# Patient Record
Sex: FEMALE | Race: WHITE | NOT HISPANIC OR LATINO | Employment: UNEMPLOYED | URBAN - METROPOLITAN AREA
[De-identification: names, ages, dates, MRNs, and addresses within clinical notes are randomized per-mention and may not be internally consistent; named-entity substitution may affect disease eponyms.]

---

## 2017-01-11 ENCOUNTER — OFFICE VISIT (OUTPATIENT)
Dept: ORTHOPEDIC SURGERY | Facility: CLINIC | Age: 37
End: 2017-01-11

## 2017-01-11 DIAGNOSIS — M75.50 SUBACROMIAL BURSITIS: Primary | ICD-10-CM

## 2017-01-11 PROCEDURE — 99212 OFFICE O/P EST SF 10 MIN: CPT | Performed by: NURSE PRACTITIONER

## 2017-01-11 PROCEDURE — 20610 DRAIN/INJ JOINT/BURSA W/O US: CPT | Performed by: NURSE PRACTITIONER

## 2017-01-11 RX ORDER — MELOXICAM 7.5 MG/1
TABLET ORAL
Refills: 2 | COMMUNITY
Start: 2016-11-10 | End: 2017-06-12

## 2017-01-11 RX ORDER — LIDOCAINE HYDROCHLORIDE 10 MG/ML
2 INJECTION, SOLUTION INFILTRATION; PERINEURAL
Status: COMPLETED | OUTPATIENT
Start: 2017-01-11 | End: 2017-01-11

## 2017-01-11 RX ORDER — BUPIVACAINE HYDROCHLORIDE 5 MG/ML
2 INJECTION, SOLUTION EPIDURAL; INTRACAUDAL
Status: COMPLETED | OUTPATIENT
Start: 2017-01-11 | End: 2017-01-11

## 2017-01-11 RX ORDER — TRIAMCINOLONE ACETONIDE 40 MG/ML
80 INJECTION, SUSPENSION INTRA-ARTICULAR; INTRAMUSCULAR
Status: COMPLETED | OUTPATIENT
Start: 2017-01-11 | End: 2017-01-11

## 2017-01-11 RX ADMIN — BUPIVACAINE HYDROCHLORIDE 2 ML: 5 INJECTION, SOLUTION EPIDURAL; INTRACAUDAL at 11:23

## 2017-01-11 RX ADMIN — TRIAMCINOLONE ACETONIDE 80 MG: 40 INJECTION, SUSPENSION INTRA-ARTICULAR; INTRAMUSCULAR at 11:23

## 2017-01-11 RX ADMIN — LIDOCAINE HYDROCHLORIDE 2 ML: 10 INJECTION, SOLUTION INFILTRATION; PERINEURAL at 11:23
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
## 2017-01-11 NOTE — MR AVS SNAPSHOT
Kristin Taylor   2017 11:15 AM   Office Visit    Dept Phone:  [phone]   Encounter #:  [id number]    Provider:  CHRISTOS Randle   Department:  [facility]                Your Full Care Plan              Your Updated Medication List          This list is accurate as of: 17 11:32 AM.  Always use your most recent med list.                citalopram 20 MG tablet   Commonly known as:  CeleXA       clonazePAM 1 MG tablet   Commonly known as:  KlonoPIN       cyclobenzaprine 10 MG tablet   Commonly known as:  FLEXERIL       diclofenac 50 MG EC tablet   Commonly known as:  VOLTAREN       meloxicam 7.5 MG tablet   Commonly known as:  MOBIC       omeprazole 40 MG capsule   Commonly known as:  priLOSEC   Take 1 capsule by mouth daily.       traMADol 50 MG tablet   Commonly known as:  ULTRAM               We Performed the Following     Large Joint Arthrocentesis       Instructions           Patient Instructions History      Upcoming Appointments     Visit Type Date Time Department    FOLLOW UP 2017 11:15 AM JERERLL ALMAGUER      [employer] Signup     [facility] [employer] allows you to send messages to your doctor, view your test results, renew your prescriptions, schedule appointments, and more. To sign up, go to [employer] and click on the Sign Up Now link in the New User? box. Enter your [employer] Activation Code exactly as it appears below along with the last four digits of your Social Security Number and your Date of Birth () to complete the sign-up process. If you do not sign up before the expiration date, you must request a new code.    [employer] Activation Code: [email or web address]  Expires: 2017 11:32 AM    If you have questions, you can email [email or web address] or call [phone] to talk to our [employer] staff. Remember, [employer] is NOT to be used for urgent needs. For medical emergencies, dial 911.                  Other Info from Your Visit           Allergies     No Known Allergies      Reason for Visit     Right Shoulder - Pain, Follow-up           Vital Signs     Smoking Status                   Current Every Day Smoker

## 2017-02-09 ENCOUNTER — TELEPHONE (OUTPATIENT)
Dept: ORTHOPEDIC SURGERY | Facility: CLINIC | Age: 37
End: 2017-02-09

## 2017-02-09 DIAGNOSIS — S46.911D RIGHT SHOULDER STRAIN, SUBSEQUENT ENCOUNTER: ICD-10-CM

## 2017-02-09 DIAGNOSIS — M75.50 SUBACROMIAL BURSITIS: Primary | ICD-10-CM

## 2017-02-09 NOTE — TELEPHONE ENCOUNTER
Would like to have MRI R shoulder done; she is still in pain. Would like it on a Wednesday, or any other day after 1pm.

## 2017-02-15 ENCOUNTER — HOSPITAL ENCOUNTER (OUTPATIENT)
Dept: MRI IMAGING | Facility: HOSPITAL | Age: 37
Discharge: HOME | End: 2017-02-15
Admitting: NURSE PRACTITIONER

## 2017-02-15 DIAGNOSIS — S46.911D RIGHT SHOULDER STRAIN, SUBSEQUENT ENCOUNTER: ICD-10-CM

## 2017-02-15 DIAGNOSIS — M75.50 SUBACROMIAL BURSITIS: ICD-10-CM

## 2017-02-15 PROCEDURE — 73221 MRI JOINT UPR EXTREM W/O DYE: CPT

## 2017-02-17 ENCOUNTER — OFFICE VISIT (OUTPATIENT)
Dept: ORTHOPEDIC SURGERY | Facility: CLINIC | Age: 37
End: 2017-02-17

## 2017-02-17 DIAGNOSIS — M67.911 TENDINOPATHY OF ROTATOR CUFF, RIGHT: Primary | ICD-10-CM

## 2017-02-17 PROBLEM — M67.919 TENDINOPATHY OF ROTATOR CUFF: Status: ACTIVE | Noted: 2017-02-17

## 2017-02-17 PROCEDURE — 99212 OFFICE O/P EST SF 10 MIN: CPT | Performed by: NURSE PRACTITIONER

## 2017-02-17 RX ORDER — SULFAMETHOXAZOLE AND TRIMETHOPRIM 800; 160 MG/1; MG/1
TABLET ORAL
Refills: 0 | COMMUNITY
Start: 2017-01-30 | End: 2017-06-12

## 2017-02-17 RX ORDER — MELOXICAM 15 MG/1
TABLET ORAL
Refills: 0 | COMMUNITY
Start: 2017-01-26 | End: 2018-11-09
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
## 2017-05-01 ENCOUNTER — OFFICE VISIT (OUTPATIENT)
Dept: ORTHOPEDIC SURGERY | Facility: CLINIC | Age: 37
End: 2017-05-01

## 2017-05-01 DIAGNOSIS — M67.911 TENDINOPATHY OF ROTATOR CUFF, RIGHT: Primary | ICD-10-CM

## 2017-05-01 DIAGNOSIS — M75.50 SUBACROMIAL BURSITIS: ICD-10-CM

## 2017-05-01 PROCEDURE — 20610 DRAIN/INJ JOINT/BURSA W/O US: CPT | Performed by: NURSE PRACTITIONER

## 2017-05-01 PROCEDURE — 99213 OFFICE O/P EST LOW 20 MIN: CPT | Performed by: NURSE PRACTITIONER

## 2017-05-01 RX ORDER — HYDROCODONE BITARTRATE AND ACETAMINOPHEN 5; 325 MG/1; MG/1
TABLET ORAL
Refills: 0 | COMMUNITY
Start: 2017-04-06 | End: 2017-10-17

## 2017-05-01 RX ORDER — BUPIVACAINE HYDROCHLORIDE 5 MG/ML
2 INJECTION, SOLUTION EPIDURAL; INTRACAUDAL
Status: COMPLETED | OUTPATIENT
Start: 2017-05-01 | End: 2017-05-01

## 2017-05-01 RX ORDER — DULOXETIN HYDROCHLORIDE 60 MG/1
CAPSULE, DELAYED RELEASE ORAL
COMMUNITY
Start: 2017-04-27 | End: 2018-10-15

## 2017-05-01 RX ORDER — TRIAMCINOLONE ACETONIDE 40 MG/ML
80 INJECTION, SUSPENSION INTRA-ARTICULAR; INTRAMUSCULAR
Status: COMPLETED | OUTPATIENT
Start: 2017-05-01 | End: 2017-05-01

## 2017-05-01 RX ORDER — LIDOCAINE HYDROCHLORIDE 10 MG/ML
2 INJECTION, SOLUTION INFILTRATION; PERINEURAL
Status: COMPLETED | OUTPATIENT
Start: 2017-05-01 | End: 2017-05-01

## 2017-05-01 RX ADMIN — BUPIVACAINE HYDROCHLORIDE 2 ML: 5 INJECTION, SOLUTION EPIDURAL; INTRACAUDAL at 10:04

## 2017-05-01 RX ADMIN — LIDOCAINE HYDROCHLORIDE 2 ML: 10 INJECTION, SOLUTION INFILTRATION; PERINEURAL at 10:04

## 2017-05-01 RX ADMIN — TRIAMCINOLONE ACETONIDE 80 MG: 40 INJECTION, SUSPENSION INTRA-ARTICULAR; INTRAMUSCULAR at 10:04

## 2017-06-12 ENCOUNTER — OFFICE VISIT (OUTPATIENT)
Dept: ORTHOPEDIC SURGERY | Facility: CLINIC | Age: 37
End: 2017-06-12

## 2017-06-12 DIAGNOSIS — M79.675 GREAT TOE PAIN, LEFT: Primary | ICD-10-CM

## 2017-06-12 DIAGNOSIS — S46.911D RIGHT SHOULDER STRAIN, SUBSEQUENT ENCOUNTER: ICD-10-CM

## 2017-06-12 DIAGNOSIS — M75.50 SUBACROMIAL BURSITIS: ICD-10-CM

## 2017-06-12 DIAGNOSIS — M67.911 TENDINOPATHY OF ROTATOR CUFF, RIGHT: ICD-10-CM

## 2017-06-12 PROBLEM — M79.676 GREAT TOE PAIN: Status: ACTIVE | Noted: 2017-06-12

## 2017-06-12 PROCEDURE — 99212 OFFICE O/P EST SF 10 MIN: CPT | Performed by: NURSE PRACTITIONER

## 2017-06-12 RX ORDER — CITALOPRAM 20 MG/1
TABLET ORAL
Refills: 5 | COMMUNITY
Start: 2017-06-02 | End: 2019-10-17

## 2017-06-12 NOTE — PROGRESS NOTES
Subjective:     Patient ID: Kristin Taylor is a 36 y.o. female.    Chief Complaint: follow-up right shoulder pain, rotator cuff tendinopathy     History of Present Illness    Kristin presents for follow-up right shoulder pain and would like to discuss increased pain great toe, left foot. Has been attending physical therapy and has avoided lifting activities while working. She has also received injections at cervical spine, all of which has significantly decreased pain she has previously experienced at right shoulder. Denies that she experiences pain except with reaching out to side and lifting. She also reports increased pain at great toe, left foot and would like to discuss what can be done for pain. Denies all other concerns present at this time.      Social History     Occupational History   • Not on file.     Social History Main Topics   • Smoking status: Current Every Day Smoker     Packs/day: 0.50     Years: 6.00     Types: Cigarettes   • Smokeless tobacco: Never Used   • Alcohol use No   • Drug use: No   • Sexual activity: Defer      Past Medical History:   Diagnosis Date   • Anxiety      Past Surgical History:   Procedure Laterality Date   • HYSTERECTOMY      PARTIAL   • KNEE ARTHROSCOPY         No family history on file.      Review of Systems   Constitutional: Negative for chills, diaphoresis, fever and unexpected weight change.   HENT: Negative for hearing loss, nosebleeds, sore throat and tinnitus.    Eyes: Negative for pain and visual disturbance.   Respiratory: Negative for cough, shortness of breath and wheezing.    Cardiovascular: Negative for chest pain and palpitations.   Gastrointestinal: Negative for abdominal pain, diarrhea, nausea and vomiting.   Endocrine: Negative for cold intolerance, heat intolerance and polydipsia.   Genitourinary: Negative for difficulty urinating, dysuria and hematuria.   Musculoskeletal: Negative for arthralgias, joint swelling and myalgias.   Skin: Negative for rash  and wound.   Allergic/Immunologic: Negative for environmental allergies.   Neurological: Negative for dizziness, syncope and numbness.   Hematological: Does not bruise/bleed easily.   Psychiatric/Behavioral: Negative for dysphoric mood and sleep disturbance. The patient is not nervous/anxious.    All other systems reviewed and are negative.          Objective:  Physical Exam    General: No acute distress.  Eyes: conjunctiva clear; pupils equally round and reactive  ENT: external ears and nose atraumatic; oropharynx clear  CV: no peripheral edema  Resp: normal respiratory effort  Skin: no rashes or wounds; normal turgor  Psych: mood and affect appropriate; recent and remote memory intact    There were no vitals filed for this visit.  There were no vitals filed for this visit.  There is no height or weight on file to calculate BMI.     Right Shoulder Exam     Tenderness   The patient is experiencing tenderness in the acromion.    Range of Motion   Forward Flexion: 180   External Rotation: 80     Muscle Strength   Internal Rotation: 5/5   External Rotation: 5/5   Supraspinatus: 4/5   Subscapularis: 4/5   Biceps: 5/5     Tests   Apprehension: negative  Cross Arm: negative  Drop Arm: negative  Hawkin's test: positive  Impingement: positive  Sulcus: absent    Other   Erythema: absent  Scars: absent  Sensation: normal  Pulse: present    Comments:  Negative Speed's  Mildly positive empty can  Negative [place]'s  Positive bear hug             Assessment:       1. Great toe pain, left    2. Right shoulder strain, subsequent encounter    3. Subacromial bursitis    4. Tendinopathy of rotator cuff, right          Plan:  1. Discussed plan of care with patient. Will continue with physical therapy and home exercises at this time. Will plan to follow-up as needed.  2. Discussed will refer her to podiatry to further discuss great toe pain. Patient verbalized understanding of all information and agrees with plan of care. Denies all  other concerns present at this time.

## 2017-06-29 ENCOUNTER — HOSPITAL ENCOUNTER (OUTPATIENT)
Dept: GENERAL RADIOLOGY | Facility: HOSPITAL | Age: 37
Discharge: HOME | End: 2017-06-29
Attending: PODIATRIST

## 2017-06-29 ENCOUNTER — HOSPITAL ENCOUNTER (OUTPATIENT)
Dept: GENERAL RADIOLOGY | Facility: HOSPITAL | Age: 37
Discharge: HOME | End: 2017-06-29
Attending: PODIATRIST | Admitting: PODIATRIST

## 2017-06-29 DIAGNOSIS — M25.572 PAIN IN JOINT OF LEFT FOOT: Primary | ICD-10-CM

## 2017-06-29 DIAGNOSIS — M25.774 OSTEOPHYTE, RIGHT FOOT: ICD-10-CM

## 2017-06-29 DIAGNOSIS — M25.775 OSTEOPHYTE, LEFT FOOT: ICD-10-CM

## 2017-06-29 PROCEDURE — 73630 X-RAY EXAM OF FOOT: CPT

## 2017-07-25 DIAGNOSIS — M67.911 TENDINOPATHY OF ROTATOR CUFF, RIGHT: Primary | ICD-10-CM

## 2017-10-17 ENCOUNTER — OFFICE VISIT (OUTPATIENT)
Dept: ORTHOPEDIC SURGERY | Facility: CLINIC | Age: 37
End: 2017-10-17

## 2017-10-17 VITALS — WEIGHT: 122 LBS | HEIGHT: 61 IN | BODY MASS INDEX: 23.03 KG/M2

## 2017-10-17 DIAGNOSIS — M67.911 TENDINOPATHY OF ROTATOR CUFF, RIGHT: Primary | ICD-10-CM

## 2017-10-17 PROCEDURE — 99212 OFFICE O/P EST SF 10 MIN: CPT | Performed by: NURSE PRACTITIONER

## 2017-10-17 RX ORDER — OXYCODONE AND ACETAMINOPHEN 10; 325 MG/1; MG/1
1 TABLET ORAL EVERY 6 HOURS PRN
COMMUNITY
End: 2018-10-15

## 2017-10-17 NOTE — PROGRESS NOTES
Subjective:     Patient ID: Kristin Taylor is a 37 y.o. female.    Chief Complaint: follow-up right shoulder pain    History of Present Illness    Kristin presents to clinic for follow-up right shoulder pain. She is status post six days from C5-C6 fusion and has had trouble with increased pain at right shoulder, especially when taking off her shirt. She was last seen by this APRN on 06/12/2017 and received last corticosteroid injection May 2017. Denies all other concerns present at this time.      Social History     Occupational History   • Not on file.     Social History Main Topics   • Smoking status: Current Every Day Smoker     Packs/day: 0.50     Years: 6.00     Types: Cigarettes   • Smokeless tobacco: Never Used   • Alcohol use No   • Drug use: No   • Sexual activity: Defer      Past Medical History:   Diagnosis Date   • Anxiety      Past Surgical History:   Procedure Laterality Date   • HYSTERECTOMY      PARTIAL   • KNEE ARTHROSCOPY         History reviewed. No pertinent family history.      Review of Systems   Constitutional: Negative for chills, diaphoresis, fever and unexpected weight change.   HENT: Negative for hearing loss, nosebleeds, sore throat and tinnitus.    Eyes: Negative for pain and visual disturbance.   Respiratory: Negative for cough, shortness of breath and wheezing.    Cardiovascular: Negative for chest pain and palpitations.   Gastrointestinal: Negative for abdominal pain, diarrhea, nausea and vomiting.   Endocrine: Negative for cold intolerance, heat intolerance and polydipsia.   Genitourinary: Negative for difficulty urinating, dysuria and hematuria.   Musculoskeletal: Positive for myalgias. Negative for arthralgias and joint swelling.   Skin: Negative for rash and wound.   Allergic/Immunologic: Negative for environmental allergies.   Neurological: Negative for dizziness, syncope and numbness.   Hematological: Does not bruise/bleed easily.   Psychiatric/Behavioral: Negative for  "dysphoric mood and sleep disturbance. The patient is not nervous/anxious.            Objective:  Physical Exam      General: No acute distress.  Eyes: conjunctiva clear; pupils equally round and reactive  ENT: external ears and nose atraumatic; oropharynx clear  CV: no peripheral edema  Resp: normal respiratory effort  Skin: no rashes or wounds; normal turgor  Psych: mood and affect appropriate; recent and remote memory intact    Vitals:    10/17/17 1414   Weight: 122 lb (55.3 kg)   Height: 61\" (154.9 cm)     Last 2 weights    10/17/17  1414   Weight: 122 lb (55.3 kg)     Body mass index is 23.05 kg/(m^2).     Right Shoulder Exam     Tenderness   The patient is experiencing tenderness in the acromion.          Assessment:       1. Tendinopathy of rotator cuff, right          Plan:  1. Discussed plan of care with patient. Will have her follow-up with Dr. Albarado before we are able to proceed with corticosteroid injection right shoulder. Provided her with topical sample of Pennsaid and encouraged to call his office before applying to see if she is able to use topical anti-inflammatory since she is unable to take oral NSAID's, cant have a steroid injection at this time. She is able to take tylenol and denies that the percocet she has received after surgery is touching the pain at her right shoulder. Will follow-up with her after approval provided to receive steroid injection per Dr. Albarado. Patient verbalized understanding of all information and agrees with plan of care. Denies all other concerns present at this time.     Orders:  No orders of the defined types were placed in this encounter.    Dragon transcription disclaimer     Much of this encounter note is an electronic transcription/translation of spoken language to printed text. The electronic translation of spoken language may permit erroneous, or at times, nonsensical words or phrases to be inadvertently transcribed. Although I have reviewed the note for such errors, " some may still exist.

## 2018-10-15 ENCOUNTER — OFFICE VISIT (OUTPATIENT)
Dept: OBSTETRICS AND GYNECOLOGY | Facility: CLINIC | Age: 38
End: 2018-10-15

## 2018-10-15 VITALS
WEIGHT: 128 LBS | HEIGHT: 61 IN | SYSTOLIC BLOOD PRESSURE: 102 MMHG | BODY MASS INDEX: 24.17 KG/M2 | DIASTOLIC BLOOD PRESSURE: 70 MMHG

## 2018-10-15 DIAGNOSIS — R10.32 LLQ PAIN: ICD-10-CM

## 2018-10-15 DIAGNOSIS — Z01.419 WELL WOMAN EXAM WITH ROUTINE GYNECOLOGICAL EXAM: Primary | ICD-10-CM

## 2018-10-15 DIAGNOSIS — N93.9 ABNORMAL VAGINAL BLEEDING: ICD-10-CM

## 2018-10-15 DIAGNOSIS — Z11.3 SCREENING EXAMINATION FOR STD (SEXUALLY TRANSMITTED DISEASE): ICD-10-CM

## 2018-10-15 DIAGNOSIS — Z11.51 SPECIAL SCREENING EXAMINATION FOR HUMAN PAPILLOMAVIRUS (HPV): ICD-10-CM

## 2018-10-15 DIAGNOSIS — N89.8 VAGINAL DISCHARGE: ICD-10-CM

## 2018-10-15 LAB
BILIRUB BLD-MCNC: NEGATIVE MG/DL
CLARITY, POC: CLEAR
COLOR UR: YELLOW
GLUCOSE UR STRIP-MCNC: NEGATIVE MG/DL
KETONES UR QL: NEGATIVE
LEUKOCYTE EST, POC: NEGATIVE
NITRITE UR-MCNC: NEGATIVE MG/ML
PH UR: 6 [PH] (ref 5–8)
PROT UR STRIP-MCNC: NEGATIVE MG/DL
RBC # UR STRIP: NEGATIVE /UL
SP GR UR: 1.01 (ref 1–1.03)
UROBILINOGEN UR QL: NORMAL

## 2018-10-15 PROCEDURE — 99213 OFFICE O/P EST LOW 20 MIN: CPT | Performed by: OBSTETRICS & GYNECOLOGY

## 2018-10-15 PROCEDURE — 99395 PREV VISIT EST AGE 18-39: CPT | Performed by: OBSTETRICS & GYNECOLOGY

## 2018-10-15 RX ORDER — HYDROCODONE BITARTRATE AND ACETAMINOPHEN 5; 325 MG/1; MG/1
TABLET ORAL EVERY 12 HOURS SCHEDULED
COMMUNITY
End: 2018-10-15

## 2018-10-15 NOTE — PROGRESS NOTES
GYN Annual Exam     CC- Here for annual exam.     Kristin Taylor is a 38 y.o. female established patient who presents for annual well woman exam. Pt had TLH /BS with OC in 2015 for pain. She has had some LLQ pain off and on similar to her pre hysterectomy pain. She has also noticed some vaginal spotting at times. No pain with sex and she has stopped smoking for > 1 year!    OB History      Para Term  AB Living    5 3     2 4    SAB TAB Ectopic Molar Multiple Live Births    2       1          Obstetric Comments    4   1 SAB with D&C          Menarche: 14  Current contraception: status post hysterectomy- s/p TLH. BS with OC in 2014 for pain  History of abnormal Pap smear: yes - 1 abnormal with nl f/u  History of abnormal mammogram: no  Family history of uterine, colon or ovarian cancer: no  Family history of breast cancer: no  H/o STDs: none  Gardasil: none    Health Maintenance   Topic Date Due   • ANNUAL PHYSICAL  1985   • PNEUMOCOCCAL VACCINE (19-64 MEDIUM RISK) (1 of 1 - PPSV23) 2001   • TDAP/TD VACCINES (1 - Tdap) 2001   • PAP SMEAR  2017   • INFLUENZA VACCINE  2018       Past Medical History:   Diagnosis Date   • Abnormal Pap smear of cervix     1 abnl wiht nl f/u   • Anxiety    • Depression        Past Surgical History:   Procedure Laterality Date   • D&C WITH SUCTION      SAB x1   • HYSTERECTOMY  2015    TLH/BS with OC   • KNEE ARTHROSCOPY     • SHOULDER LIGAMENT REPAIR     • TUBAL ABDOMINAL LIGATION           Current Outpatient Prescriptions:   •  citalopram (CeleXA) 20 MG tablet, TAKE 1 TABLET BY MOUTH ONCE DAILY., Disp: , Rfl: 5  •  meloxicam (MOBIC) 15 MG tablet, TAKE 1 TABLET BY MOUTH DAILY, Disp: , Rfl: 0  •  omeprazole (PriLOSEC) 40 MG capsule, Take 1 capsule by mouth daily., Disp: 30 capsule, Rfl: 0    Allergies   Allergen Reactions   • Hydrocodone-Acetaminophen Nausea And Vomiting     PT ON NORCO 5 AND TOLERATES FINE, BUT REPORT VICODIN CAUSED  "N/V AFTER DENTAL EXTRACTION   • Tramadol Nausea And Vomiting       Social History   Substance Use Topics   • Smoking status: Former Smoker     Packs/day: 0.50     Years: 6.00     Types: Cigarettes   • Smokeless tobacco: Never Used      Comment: Quit x 1 year   • Alcohol use No       Family History   Problem Relation Age of Onset   • Breast cancer Neg Hx    • Ovarian cancer Neg Hx    • Colon cancer Neg Hx    • Prostate cancer Neg Hx    • Deep vein thrombosis Neg Hx        Review of Systems   Constitutional: Negative for appetite change, fatigue, fever and unexpected weight change.   Respiratory: Negative for cough and shortness of breath.    Cardiovascular: Negative for chest pain and palpitations.   Gastrointestinal: Positive for abdominal pain (LLQ pain). Negative for abdominal distention, constipation, diarrhea and nausea.   Endocrine: Negative for cold intolerance and heat intolerance.   Genitourinary: Positive for vaginal bleeding and vaginal discharge. Negative for dyspareunia, dysuria, menstrual problem and pelvic pain.   Skin: Negative for color change and rash.   Neurological: Negative for headaches.   Psychiatric/Behavioral: Negative for dysphoric mood. The patient is not nervous/anxious.    All other systems reviewed and are negative.      /70   Ht 154.9 cm (60.98\")   Wt 58.1 kg (128 lb)   Breastfeeding? No   BMI 24.20 kg/m²     Physical Exam   Constitutional: She is oriented to person, place, and time. She appears well-developed and well-nourished.   HENT:   Head: Normocephalic and atraumatic.   Eyes: Conjunctivae are normal. No scleral icterus.   Neck: Neck supple. No thyromegaly present.   Cardiovascular: Normal rate, regular rhythm and normal heart sounds.    Pulmonary/Chest: Effort normal and breath sounds normal. She has no wheezes.   Abdominal: Soft. Bowel sounds are normal. She exhibits no distension and no mass. There is tenderness (TTP LLQ). There is no rebound and no guarding. No " hernia.   Genitourinary: Pelvic exam was performed with patient supine. There is no rash, tenderness, lesion or injury on the right labia. There is no rash, tenderness, lesion or injury on the left labia. Right adnexum displays no mass, no tenderness and no fullness. Left adnexum displays tenderness. Left adnexum displays no mass and no fullness. No erythema, tenderness or bleeding in the vagina. No foreign body in the vagina. No signs of injury around the vagina. Vaginal discharge found.   Genitourinary Comments: Nicky cuff, no granulation tissue noted.   Scant D/C in vault.   TTP LLQ   Neurological: She is alert and oriented to person, place, and time.   Skin: Skin is warm and dry.   Psychiatric: She has a normal mood and affect. Her behavior is normal. Judgment and thought content normal.   Nursing note and vitals reviewed.         Assessment/Plan    1) GYN HM: check pap/HPV   SBE demonstrated and encouraged.  2) STD screening: accepts Condoms encouraged.  3) Contraception: s/p TLH with OC. Op note and path reviewed and no endometriosis or adenomyosis noted  4) Family Planning: no plans, encourage folic acid daily  5) Diet and Exercise discussed  6) Smoking Status: previous smoker- congratulations!  7) LLQ pain- check TVUS and visit.   8) MMG- plan age 42  9)Follow up prn or 1 year  10) Spotting- exam appears normal, normal US- check NuSwab Y/B/M/L       Kristin was seen today for gynecologic exam.    Diagnoses and all orders for this visit:    Well woman exam with routine gynecological exam  -     POC Urinalysis Dipstick  -     Pap IG, HPV-hr  -     Hepatitis B Surface Antigen  -     Hepatitis C Antibody  -     HIV-1 / O / 2 Ag / Antibody 4th Generation  -     HSV 1 & 2 - Specific Antibody, IgG  -     RPR, Rfx Qn RPR / Confirm TP    Special screening examination for human papillomavirus (HPV)  -     Pap IG, HPV-hr    Vaginal discharge  -     NuSwab VG+ - Swab, Vagina  -     Genital Mycoplasmas MORGAN, Swab - Swab,  Vagina    LLQ pain    Screening examination for STD (sexually transmitted disease)    Abnormal vaginal bleeding          Nadine Emanuel MD  10/15/2018  3:18 PM

## 2018-10-16 LAB
HBV SURFACE AG SERPL QL IA: NEGATIVE
HCV AB S/CO SERPL IA: <0.1 S/CO RATIO (ref 0–0.9)
HIV 1+2 AB+HIV1 P24 AG SERPL QL IA: NON REACTIVE
HSV1 IGG SER IA-ACNC: <0.91 INDEX (ref 0–0.9)
HSV2 IGG SER IA-ACNC: 10 INDEX (ref 0–0.9)
RPR SER QL: NON REACTIVE

## 2018-10-16 NOTE — PROGRESS NOTES
PIP= blood portion of STD panel shows previous exposure to the genital herpes virus. Enc pt to make appt for any questions.

## 2018-10-18 LAB
CYTOLOGIST CVX/VAG CYTO: NORMAL
CYTOLOGY CVX/VAG DOC THIN PREP: NORMAL
DX ICD CODE: NORMAL
HIV 1 & 2 AB SER-IMP: NORMAL
HPV I/H RISK 1 DNA CVX QL PROBE+SIG AMP: NEGATIVE
OTHER STN SPEC: NORMAL
PATH REPORT.FINAL DX SPEC: NORMAL
STAT OF ADQ CVX/VAG CYTO-IMP: NORMAL

## 2018-10-19 LAB
A VAGINAE DNA VAG QL NAA+PROBE: ABNORMAL SCORE
BVAB2 DNA VAG QL NAA+PROBE: ABNORMAL SCORE
C ALBICANS DNA VAG QL NAA+PROBE: NEGATIVE
C GLABRATA DNA VAG QL NAA+PROBE: NEGATIVE
C TRACH RRNA SPEC QL NAA+PROBE: NEGATIVE
LABORATORY COMMENT REPORT: ABNORMAL
M GENITALIUM DNA SPEC QL NAA+PROBE: NEGATIVE
M HOMINIS DNA SPEC QL NAA+PROBE: POSITIVE
MEGA1 DNA VAG QL NAA+PROBE: ABNORMAL SCORE
N GONORRHOEA RRNA SPEC QL NAA+PROBE: NEGATIVE
T VAGINALIS RRNA SPEC QL NAA+PROBE: NEGATIVE
UREAPLASMA DNA SPEC QL NAA+PROBE: POSITIVE

## 2018-10-19 RX ORDER — AZITHROMYCIN 500 MG/1
TABLET, FILM COATED ORAL
Qty: 2 TABLET | Refills: 0 | Status: SHIPPED | OUTPATIENT
Start: 2018-10-19 | End: 2019-10-17

## 2018-10-19 RX ORDER — METRONIDAZOLE 500 MG/1
500 TABLET ORAL 2 TIMES DAILY
Qty: 14 TABLET | Refills: 0 | Status: SHIPPED | OUTPATIENT
Start: 2018-10-19 | End: 2018-10-26

## 2018-11-09 ENCOUNTER — HOSPITAL ENCOUNTER (EMERGENCY)
Facility: HOSPITAL | Age: 38
Discharge: HOME | End: 2018-11-09
Attending: EMERGENCY MEDICINE | Admitting: EMERGENCY MEDICINE

## 2018-11-09 VITALS
OXYGEN SATURATION: 97 % | RESPIRATION RATE: 14 BRPM | HEART RATE: 85 BPM | TEMPERATURE: 97.8 F | BODY MASS INDEX: 23.03 KG/M2 | DIASTOLIC BLOOD PRESSURE: 68 MMHG | SYSTOLIC BLOOD PRESSURE: 88 MMHG | HEIGHT: 61 IN | WEIGHT: 122 LBS

## 2018-11-09 DIAGNOSIS — N10 PYELONEPHRITIS, ACUTE: Primary | ICD-10-CM

## 2018-11-09 LAB
ALBUMIN SERPL-MCNC: 4.3 G/DL (ref 3.5–5.2)
ALBUMIN/GLOB SERPL: 1.8 G/DL
ALP SERPL-CCNC: 56 U/L (ref 40–129)
ALT SERPL W P-5'-P-CCNC: 15 U/L (ref 5–33)
ANION GAP SERPL CALCULATED.3IONS-SCNC: 13.3 MMOL/L
AST SERPL-CCNC: 18 U/L (ref 5–32)
BACTERIA UR QL AUTO: ABNORMAL /HPF
BASOPHILS # BLD AUTO: 0.04 10*3/MM3 (ref 0–0.2)
BASOPHILS NFR BLD AUTO: 0.2 % (ref 0–2)
BILIRUB SERPL-MCNC: 1 MG/DL (ref 0.2–1.2)
BILIRUB UR QL STRIP: ABNORMAL
BUN BLD-MCNC: 7 MG/DL (ref 6–20)
BUN/CREAT SERPL: 8.8 (ref 7–25)
CALCIUM SPEC-SCNC: 9 MG/DL (ref 8.6–10.5)
CHLORIDE SERPL-SCNC: 101 MMOL/L (ref 98–107)
CLARITY UR: ABNORMAL
CO2 SERPL-SCNC: 25.7 MMOL/L (ref 22–29)
COLOR UR: ABNORMAL
CREAT BLD-MCNC: 0.8 MG/DL (ref 0.57–1)
DEPRECATED RDW RBC AUTO: 42.6 FL (ref 37–54)
EOSINOPHIL # BLD AUTO: 0.02 10*3/MM3 (ref 0.1–0.3)
EOSINOPHIL NFR BLD AUTO: 0.1 % (ref 0–4)
ERYTHROCYTE [DISTWIDTH] IN BLOOD BY AUTOMATED COUNT: 12.8 % (ref 11.5–14.5)
GFR SERPL CREATININE-BSD FRML MDRD: 81 ML/MIN/1.73
GLOBULIN UR ELPH-MCNC: 2.4 GM/DL
GLUCOSE BLD-MCNC: 109 MG/DL (ref 65–99)
GLUCOSE UR STRIP-MCNC: NEGATIVE MG/DL
HCT VFR BLD AUTO: 38.8 % (ref 37–47)
HGB BLD-MCNC: 13 G/DL (ref 12–16)
HGB UR QL STRIP.AUTO: ABNORMAL
HYALINE CASTS UR QL AUTO: ABNORMAL /LPF
IMM GRANULOCYTES # BLD: 0.06 10*3/MM3 (ref 0–0.03)
IMM GRANULOCYTES NFR BLD: 0.3 % (ref 0–0.5)
KETONES UR QL STRIP: ABNORMAL
LEUKOCYTE ESTERASE UR QL STRIP.AUTO: ABNORMAL
LIPASE SERPL-CCNC: 12 U/L (ref 13–60)
LYMPHOCYTES # BLD AUTO: 0.43 10*3/MM3 (ref 0.6–4.8)
LYMPHOCYTES NFR BLD AUTO: 2.4 % (ref 20–45)
MCH RBC QN AUTO: 30.2 PG (ref 27–31)
MCHC RBC AUTO-ENTMCNC: 33.5 G/DL (ref 31–37)
MCV RBC AUTO: 90 FL (ref 81–99)
MONOCYTES # BLD AUTO: 0.29 10*3/MM3 (ref 0–1)
MONOCYTES NFR BLD AUTO: 1.7 % (ref 3–8)
NEUTROPHILS # BLD AUTO: 16.73 10*3/MM3 (ref 1.5–8.3)
NEUTROPHILS NFR BLD AUTO: 95.3 % (ref 45–70)
NITRITE UR QL STRIP: POSITIVE
NRBC BLD MANUAL-RTO: 0 /100 WBC (ref 0–0)
PH UR STRIP.AUTO: 7 [PH] (ref 4.5–8)
PLATELET # BLD AUTO: 217 10*3/MM3 (ref 140–500)
PMV BLD AUTO: 12.5 FL (ref 7.4–10.4)
POTASSIUM BLD-SCNC: 3.4 MMOL/L (ref 3.5–5.2)
PROT SERPL-MCNC: 6.7 G/DL (ref 6–8.5)
PROT UR QL STRIP: ABNORMAL
RBC # BLD AUTO: 4.31 10*6/MM3 (ref 4.2–5.4)
RBC # UR: ABNORMAL /HPF
REF LAB TEST METHOD: ABNORMAL
SODIUM BLD-SCNC: 140 MMOL/L (ref 136–145)
SP GR UR STRIP: 1.02 (ref 1–1.03)
SQUAMOUS #/AREA URNS HPF: ABNORMAL /HPF
UROBILINOGEN UR QL STRIP: ABNORMAL
WBC NRBC COR # BLD: 17.57 10*3/MM3 (ref 4.8–10.8)
WBC UR QL AUTO: ABNORMAL /HPF

## 2018-11-09 PROCEDURE — 99284 EMERGENCY DEPT VISIT MOD MDM: CPT | Performed by: EMERGENCY MEDICINE

## 2018-11-09 PROCEDURE — 96361 HYDRATE IV INFUSION ADD-ON: CPT

## 2018-11-09 PROCEDURE — 99284 EMERGENCY DEPT VISIT MOD MDM: CPT

## 2018-11-09 PROCEDURE — 25010000002 KETOROLAC TROMETHAMINE PER 15 MG: Performed by: EMERGENCY MEDICINE

## 2018-11-09 PROCEDURE — 83690 ASSAY OF LIPASE: CPT | Performed by: EMERGENCY MEDICINE

## 2018-11-09 PROCEDURE — 85025 COMPLETE CBC W/AUTO DIFF WBC: CPT | Performed by: EMERGENCY MEDICINE

## 2018-11-09 PROCEDURE — 81001 URINALYSIS AUTO W/SCOPE: CPT | Performed by: EMERGENCY MEDICINE

## 2018-11-09 PROCEDURE — 87086 URINE CULTURE/COLONY COUNT: CPT | Performed by: EMERGENCY MEDICINE

## 2018-11-09 PROCEDURE — 25010000002 ONDANSETRON PER 1 MG: Performed by: EMERGENCY MEDICINE

## 2018-11-09 PROCEDURE — 80053 COMPREHEN METABOLIC PANEL: CPT | Performed by: EMERGENCY MEDICINE

## 2018-11-09 PROCEDURE — 96365 THER/PROPH/DIAG IV INF INIT: CPT

## 2018-11-09 PROCEDURE — 96375 TX/PRO/DX INJ NEW DRUG ADDON: CPT

## 2018-11-09 PROCEDURE — 25010000002 CEFTRIAXONE SODIUM-DEXTROSE 1-3.74 GM-%(50ML) RECONSTITUTED SOLUTION: Performed by: EMERGENCY MEDICINE

## 2018-11-09 RX ORDER — CEFUROXIME AXETIL 250 MG/1
250 TABLET ORAL 2 TIMES DAILY
Qty: 10 TABLET | Refills: 0 | Status: SHIPPED | OUTPATIENT
Start: 2018-11-09 | End: 2019-10-17

## 2018-11-09 RX ORDER — KETOROLAC TROMETHAMINE 30 MG/ML
15 INJECTION, SOLUTION INTRAMUSCULAR; INTRAVENOUS ONCE
Status: COMPLETED | OUTPATIENT
Start: 2018-11-09 | End: 2018-11-09

## 2018-11-09 RX ORDER — CEFTRIAXONE 1 G/50ML
1 INJECTION, SOLUTION INTRAVENOUS ONCE
Status: COMPLETED | OUTPATIENT
Start: 2018-11-09 | End: 2018-11-09

## 2018-11-09 RX ORDER — ONDANSETRON 2 MG/ML
4 INJECTION INTRAMUSCULAR; INTRAVENOUS ONCE
Status: COMPLETED | OUTPATIENT
Start: 2018-11-09 | End: 2018-11-09

## 2018-11-09 RX ORDER — SODIUM CHLORIDE 0.9 % (FLUSH) 0.9 %
10 SYRINGE (ML) INJECTION AS NEEDED
Status: DISCONTINUED | OUTPATIENT
Start: 2018-11-09 | End: 2018-11-10 | Stop reason: HOSPADM

## 2018-11-09 RX ADMIN — ONDANSETRON 4 MG: 2 INJECTION, SOLUTION INTRAMUSCULAR; INTRAVENOUS at 21:38

## 2018-11-09 RX ADMIN — SODIUM CHLORIDE 1000 ML: 9 INJECTION, SOLUTION INTRAVENOUS at 21:37

## 2018-11-09 RX ADMIN — CEFTRIAXONE 1 G: 1 INJECTION, SOLUTION INTRAVENOUS at 22:26

## 2018-11-09 RX ADMIN — KETOROLAC TROMETHAMINE 15 MG: 30 INJECTION, SOLUTION INTRAMUSCULAR at 22:33

## 2018-11-10 NOTE — ED NOTES
Pt lying on left side, awake , informed of wait. Pt c/o H/A     Hannah Griffin RN  11/09/18 2029

## 2018-11-10 NOTE — ED PROVIDER NOTES
Subjective   History of Present Illness  History of Present Illness    Chief complaint: Headache, dizziness, nausea, vomiting and questionable hematuria.    Location: Not applicable    Quality/Severity:  Moderate    Timing/Duration: Symptoms started this morning    Modifying Factors: Patient currently on antibiotics for a urinary tract infection    Associated Symptoms: None    Narrative: The patient is a 38-year-old white female who presents as noted above.  The patient relates that this morning she urinated and afterwards noted some blood in the toilet and also on the tissue.  Patient has had a previous hysterectomy.  Patient was diagnosed with a urinary tract infection on October 15 but the patient did not start any antibiotics until November 3.  Today the patient has had some general malaise.  Patient reports that she normally has low blood pressure and after taking a hot bath and getting out she felt dizzy, lightheaded, nauseous and had several episodes of vomiting.  Mild global headache now.    Review of Systems   Constitutional: Positive for activity change, appetite change and fatigue.   Gastrointestinal: Positive for abdominal pain (lower abdomen), nausea and vomiting.   Neurological: Positive for dizziness, light-headedness and headaches.   All other systems reviewed and are negative.      Past Medical History:   Diagnosis Date   • Abnormal Pap smear of cervix     1 abnl wiht nl f/u   • Anxiety    • Bacterial vaginosis    • Depression        Allergies   Allergen Reactions   • Hydrocodone-Acetaminophen Nausea And Vomiting     PT ON NORCO 5 AND TOLERATES FINE, BUT REPORT VICODIN CAUSED N/V AFTER DENTAL EXTRACTION   • Tramadol Nausea And Vomiting       Past Surgical History:   Procedure Laterality Date   • D&C WITH SUCTION      SAB x1   • HYSTERECTOMY  08/2015    TLH/BS with OC   • KNEE ARTHROSCOPY     • SHOULDER LIGAMENT REPAIR     • TUBAL ABDOMINAL LIGATION  2012       Family History   Problem Relation Age  of Onset   • Breast cancer Neg Hx    • Ovarian cancer Neg Hx    • Colon cancer Neg Hx    • Prostate cancer Neg Hx    • Deep vein thrombosis Neg Hx        Social History     Social History   • Marital status: Single     Social History Main Topics   • Smoking status: Former Smoker     Packs/day: 0.50     Years: 6.00     Types: Cigarettes   • Smokeless tobacco: Never Used      Comment: Quit x 1 year   • Alcohol use No   • Drug use: No   • Sexual activity: Yes     Partners: Male     Birth control/ protection: Surgical      Comment: THL w/OC     Other Topics Concern   • Not on file           Objective   Physical Exam   Constitutional: She is oriented to person, place, and time.   Patient is a thin, ill-appearing, 38-year-old, white female.   HENT:   Head: Normocephalic and atraumatic.   Eyes: Pupils are equal, round, and reactive to light. Conjunctivae and EOM are normal.   Neck: Normal range of motion. Neck supple. No thyromegaly present.   Cardiovascular: Normal rate, regular rhythm and normal heart sounds.    No murmur heard.  Pulmonary/Chest: Effort normal and breath sounds normal. No respiratory distress. She has no wheezes. She has no rales.   Abdominal: Soft. Bowel sounds are normal. She exhibits no distension. There is tenderness (lower abdomen and suprapubic areas).   Musculoskeletal: Normal range of motion. She exhibits no edema or tenderness.   No CVAT   Lymphadenopathy:     She has no cervical adenopathy.   Neurological: She is alert and oriented to person, place, and time.   Skin: Skin is warm and dry. No rash noted.   Psychiatric: She has a normal mood and affect. Her behavior is normal.   Nursing note and vitals reviewed.      Final diagnoses:   Pyelonephritis, acute       Procedures           ED Course  ED Course as of Nov 09 2328 Fri Nov 09, 2018   2324 Patient does note that she always has low blood pressure.  A review of her October 15 visit with GYN in shows that she was treated for bacterial  vaginosis.  Zithromax and Flagyl prescribed.  Infected urine plus leukocytosis and systemic symptoms favors a pyelonephritis.  This was explained to the patient along with treatment plan, expectations and warnings.  Rocephin given in ED.  Urine culture pending  [ML]      ED Course User Index  [ML] Jaime Nam MD                  MDM  Number of Diagnoses or Management Options  Pyelonephritis, acute: new and requires workup     Amount and/or Complexity of Data Reviewed  Clinical lab tests: ordered and reviewed    Risk of Complications, Morbidity, and/or Mortality  Presenting problems: high  Diagnostic procedures: moderate  Management options: moderate    Patient Progress  Patient progress: improved  My differential diagnosis for abdominal pain includes but is not limited to:  Gastritis, gastroenteritis, peptic ulcer disease, GERD, esophageal perforation, acute appendicitis, mesenteric adenitis, Meckel’s diverticulum, epiploic appendagitis, diverticulitis, colon cancer, ulcerative colitis, Crohn’s disease, intussusception, small bowel obstruction, adhesions, ischemic bowel, perforated viscus, ileus, obstipation, biliary colic, cholecystitis, cholelithiasis, Carloz-Nicolas Claudy, hepatitis, pancreatitis, common bile duct obstruction, cholangitis, bile leak, splenic trauma, splenic rupture, splenic infarction, splenic abscess, abdominal abscess, ascites, spontaneous bacterial peritonitis, hernia, UTI, cystitis, prostatitis, ureterolithiasis, urinary obstruction, ovarian cyst, torsion, pregnancy, ectopic pregnancy, PID, pelvic abscess, mittelschmerz, endometriosis, AAA, myocardial infarction, pneumonia, cancer, porphyria, DKA, medications, sickle cell, viral syndrome, zoster    Labs this visit  Lab Results (last 24 hours)     Procedure Component Value Units Date/Time    Urinalysis With Culture If Indicated - Urine, Catheter [[id number]]  (Abnormal) Collected:  11/09/18 2130    Specimen:  Urine from Urine, Catheter  Updated:  11/09/18 2143     Color, UA Other (A)     Appearance, UA Turbid (A)     pH, UA 7.0     Specific Gravity, UA 1.020     Glucose, UA Negative     Ketones, UA 80 mg/dL (3+)     Bilirubin, UA Small (1+) (A)     Blood, UA Large (3+) (A)     Protein,  mg/dL (2+) (A)     Leuk Esterase, UA Moderate (2+) (A)     Nitrite, UA Positive (A)     Urobilinogen, UA 1.0 E.U./dL    CBC & Differential [[id number]] Collected:  11/09/18 2130    Specimen:  Blood Updated:  11/09/18 2137    Narrative:       The following orders were created for panel order CBC & Differential.  Procedure                               Abnormality         Status                     ---------                               -----------         ------                     CBC Auto Differential[[id number]]        Abnormal            Final result                 Please view results for these tests on the individual orders.    Comprehensive Metabolic Panel [[id number]]  (Abnormal) Collected:  11/09/18 2130    Specimen:  Blood Updated:  11/09/18 2153     Glucose 109 (H) mg/dL      BUN 7 mg/dL      Creatinine 0.80 mg/dL      Sodium 140 mmol/L      Potassium 3.4 (L) mmol/L      Chloride 101 mmol/L      CO2 25.7 mmol/L      Calcium 9.0 mg/dL      Total Protein 6.7 g/dL      Albumin 4.30 g/dL      ALT (SGPT) 15 U/L      AST (SGOT) 18 U/L      Alkaline Phosphatase 56 U/L      Total Bilirubin 1.0 mg/dL      eGFR Non African Amer 81 mL/min/1.73      Globulin 2.4 gm/dL      A/G Ratio 1.8 g/dL      BUN/Creatinine Ratio 8.8     Anion Gap 13.3 mmol/L     Lipase [[id number]]  (Abnormal) Collected:  11/09/18 2130    Specimen:  Blood Updated:  11/09/18 2153     Lipase 12 (L) U/L     CBC Auto Differential [[id number]]  (Abnormal) Collected:  11/09/18 2130    Specimen:  Blood Updated:  11/09/18 2137     WBC 17.57 (H) 10*3/mm3      RBC 4.31 10*6/mm3      Hemoglobin 13.0 g/dL      Hematocrit 38.8 %      MCV 90.0 fL      MCH 30.2 pg      MCHC 33.5 g/dL      RDW 12.8 %       RDW-SD 42.6 fl      MPV 12.5 (H) fL      Platelets 217 10*3/mm3      Neutrophil % 95.3 (H) %      Lymphocyte % 2.4 (L) %      Monocyte % 1.7 (L) %      Eosinophil % 0.1 %      Basophil % 0.2 %      Immature Grans % 0.3 %      Neutrophils, Absolute 16.73 (H) 10*3/mm3      Lymphocytes, Absolute 0.43 (L) 10*3/mm3      Monocytes, Absolute 0.29 10*3/mm3      Eosinophils, Absolute 0.02 (L) 10*3/mm3      Basophils, Absolute 0.04 10*3/mm3      Immature Grans, Absolute 0.06 (H) 10*3/mm3      nRBC 0.0 /100 WBC     Urinalysis, Microscopic Only - Urine, Clean Catch [[id number]]  (Abnormal) Collected:  11/09/18 2130    Specimen:  Urine from Urine, Catheter Updated:  11/09/18 2148     RBC, UA Too Numerous to Count (A) /HPF      WBC, UA 13-20 (A) /HPF      Bacteria, UA Trace (A) /HPF      Squamous Epithelial Cells, UA 0-2 /HPF      Hyaline Casts, UA None Seen /LPF      Methodology Manual Light Microscopy    Urine Culture - Urine, [[id number]] Collected:  11/09/18 2130    Specimen:  Urine from Urine, Catheter Updated:  11/09/18 2148        Prescribed on discharge             Medication List      New Prescriptions    cefuroxime 250 MG tablet  Commonly known as:  CEFTIN  Take 1 tablet by mouth 2 (Two) Times a Day.          All lab results, imaging results and other tests were reviewed by Jaime Nam MD and unless otherwise specified were found to be unremarkable.        Final diagnoses:   Pyelonephritis, acute            Jaime Nam MD  11/09/18 3375

## 2018-11-11 LAB — BACTERIA SPEC AEROBE CULT: NORMAL

## 2018-11-19 ENCOUNTER — OFFICE VISIT (OUTPATIENT)
Dept: OBSTETRICS AND GYNECOLOGY | Facility: CLINIC | Age: 38
End: 2018-11-19

## 2018-11-19 ENCOUNTER — PROCEDURE VISIT (OUTPATIENT)
Dept: OBSTETRICS AND GYNECOLOGY | Facility: CLINIC | Age: 38
End: 2018-11-19

## 2018-11-19 VITALS
BODY MASS INDEX: 22.54 KG/M2 | SYSTOLIC BLOOD PRESSURE: 118 MMHG | HEIGHT: 61 IN | WEIGHT: 119.4 LBS | DIASTOLIC BLOOD PRESSURE: 66 MMHG

## 2018-11-19 DIAGNOSIS — R76.8 HSV-2 SEROPOSITIVE: ICD-10-CM

## 2018-11-19 DIAGNOSIS — N76.0 ACUTE VAGINITIS: ICD-10-CM

## 2018-11-19 DIAGNOSIS — R10.32 LLQ PAIN: Primary | ICD-10-CM

## 2018-11-19 DIAGNOSIS — R10.32 LLQ PAIN: ICD-10-CM

## 2018-11-19 DIAGNOSIS — R31.9 HEMATURIA, UNSPECIFIED TYPE: Primary | ICD-10-CM

## 2018-11-19 PROCEDURE — 99214 OFFICE O/P EST MOD 30 MIN: CPT | Performed by: OBSTETRICS & GYNECOLOGY

## 2018-11-19 PROCEDURE — 76830 TRANSVAGINAL US NON-OB: CPT | Performed by: OBSTETRICS & GYNECOLOGY

## 2018-11-19 NOTE — PROGRESS NOTES
"Kristin Taylor is a 38 y.o. patient who presents for follow up of   Chief Complaint   Patient presents with   • Follow-up       37 yo est pt here to f/u LLQ pain and VB despite hysterectomy. She had ureaplasma and mycoplasma on culture and after treatment has had no further VB. She was dx with pyleo earlier in the month after an episode of gross hematuria sent her to the ER. She has had 2 episodes of pyelo within the past several years.She is feeling better and her LLQ pain is better, so she thinks it may have been her bladder all along.  She is certain she has a yeast infection.Her US today shows a surgically absent uterus and a small R ovarian cyst that is 1.5 x 1.3 cm and simple and a left that is 1.1 x 1.1 cm. There is no comparable data for this indication.  Her STD screen showed HSV 2 and she has never had an outbreak.       The following portions of the patient's history were reviewed and updated as appropriate: allergies, current medications and problem list.    Review of Systems   Constitutional: Negative for chills and fever.   Gastrointestinal: Negative for abdominal pain.   Genitourinary: Positive for hematuria (resolved after abx) and vaginal discharge. Negative for genital sores and pelvic pain.   Musculoskeletal: Negative for back pain.       /66   Ht 154.9 cm (60.98\")   Wt 54.2 kg (119 lb 6.4 oz)   Breastfeeding? No   BMI 22.57 kg/m²     Physical Exam   Constitutional: She is oriented to person, place, and time. She appears well-developed and well-nourished.   HENT:   Head: Normocephalic and atraumatic.   Eyes: Conjunctivae are normal. No scleral icterus.   Abdominal: Soft. Bowel sounds are normal. She exhibits no distension and no mass. There is no tenderness. There is no rebound and no guarding. No hernia.   Neurological: She is alert and oriented to person, place, and time.   Skin: Skin is warm and dry.   Psychiatric: She has a normal mood and affect. Her behavior is normal. " Judgment and thought content normal.   Nursing note and vitals reviewed.    A/P:  1. LLQ pain- normal US, resolving after treatment for pyelo.   2. Hematuria on dip -check UA and CS.  3. Vaginal spotting- resolved.   4. Yeast vaginitis- Diflucan 150 mg.   5. HSV 2+ - pt carrier, no outbreaks. Herpes viruses are viruses that we can be exposed to and once we are exposed, we keep them with us always.  Often these are decades between our exposure to the virus and when we are discovered to “have the virus.”  There are 2 main types of herpes viruses that we diagnose in gynecology.  The first is HSV 1, which is also known as the “cold sore” virus.  80% of adults will test positive for this virus even if they have never had a cold sore.  It is a common virus transmitted in childhood from chewing on one another’s toys.  HSV 1 can cause genital sores if it spread from the mouth to the genitals during oral sex.  HSV 2 is the most common form of genital herpes and is highly contagious.  About 15-20% of Americans carry the genital herpes virus.  HSV 2 is more common in women than men, with 1 in 5 women infected compared to 1 in 10 men.  It is also transmitted more easily from men to women than it is from women to men.  Many people have no symptoms and have never had an outbreak of painful genital sores.  HSV 2 is most contagious when genital sores are present, but it can be transmitted even when no lesions or pain is present.  If you have a partner who is not infected and you are infected, consider suppression therapy daily to help decrease risk of transmission.  Also, consider suppression if you have frequent outbreaks or are pregnant.  In order to avoid genital herpes, limit number of sexual partners and use condoms with every act of sex. Pt is in a stable, monogamous relationship and declines suppressive therapy.   6. RHM- UTD pap 10/15/18      Assessment/Plan   Kristin was seen today for follow-up.    Diagnoses and all  orders for this visit:    Hematuria, unspecified type  -     Urine Culture - Urine, Urine, Random Void  -     Urinalysis With Microscopic - Urine, Random Void    LLQ pain    Acute vaginitis    HSV-2 seropositive    Other orders  -     Microscopic Examination -  -     fluconazole (DIFLUCAN) 150 MG tablet; Take 1 tablet by mouth 1 (One) Time for 1 dose.                   No Follow-up on file.      Nadine Emanuel MD    11/19/18  5:38 PM

## 2018-11-21 LAB
APPEARANCE UR: CLEAR
BACTERIA #/AREA URNS HPF: ABNORMAL /HPF
BACTERIA UR CULT: ABNORMAL
BACTERIA UR CULT: ABNORMAL
BILIRUB UR QL STRIP: NEGATIVE
COLOR UR: (no result)
EPI CELLS #/AREA URNS HPF: ABNORMAL /HPF
GLUCOSE UR QL: NEGATIVE
HGB UR QL STRIP: (no result)
KETONES UR QL STRIP: NEGATIVE
LEUKOCYTE ESTERASE UR QL STRIP: NEGATIVE
NITRITE UR QL STRIP: NEGATIVE
OTHER ANTIBIOTIC SUSC ISLT: ABNORMAL
PH UR STRIP: 7 [PH] (ref 4.5–8)
PROT UR QL STRIP: NEGATIVE
RBC #/AREA URNS HPF: ABNORMAL /HPF
SP GR UR: 1.01 (ref 1–1.03)
UROBILINOGEN UR STRIP-MCNC: (no result) MG/DL
WBC #/AREA URNS HPF: ABNORMAL /HPF

## 2018-11-25 RX ORDER — NITROFURANTOIN 25; 75 MG/1; MG/1
100 CAPSULE ORAL 2 TIMES DAILY
Qty: 14 CAPSULE | Refills: 0 | Status: SHIPPED | OUTPATIENT
Start: 2018-11-25 | End: 2018-12-02

## 2018-11-25 RX ORDER — FLUCONAZOLE 150 MG/1
150 TABLET ORAL ONCE
Qty: 1 TABLET | Refills: 1 | Status: SHIPPED | OUTPATIENT
Start: 2018-11-25 | End: 2018-11-25

## 2018-11-26 NOTE — PROGRESS NOTES
PIP= pt still has a UTI by culture. Rx for Macrobid called in. Rec she RTO in 1 month for a repeat US ( lab only) to make sure it has cleared.

## 2019-04-08 ENCOUNTER — OFFICE VISIT (OUTPATIENT)
Dept: OBSTETRICS AND GYNECOLOGY | Facility: CLINIC | Age: 39
End: 2019-04-08

## 2019-04-08 VITALS
SYSTOLIC BLOOD PRESSURE: 94 MMHG | WEIGHT: 112.3 LBS | BODY MASS INDEX: 21.2 KG/M2 | DIASTOLIC BLOOD PRESSURE: 66 MMHG | HEIGHT: 61 IN

## 2019-04-08 DIAGNOSIS — Z13.9 SCREENING FOR CONDITION: Primary | ICD-10-CM

## 2019-04-08 DIAGNOSIS — N94.2 VAGINISMUS: ICD-10-CM

## 2019-04-08 DIAGNOSIS — N95.2 VAGINAL ATROPHY: ICD-10-CM

## 2019-04-08 DIAGNOSIS — R68.82 LOW LIBIDO: ICD-10-CM

## 2019-04-08 PROCEDURE — 99214 OFFICE O/P EST MOD 30 MIN: CPT | Performed by: OBSTETRICS & GYNECOLOGY

## 2019-04-08 RX ORDER — ESTRADIOL 0.1 MG/G
1 CREAM VAGINAL 2 TIMES WEEKLY
Qty: 45 G | Refills: 6 | Status: SHIPPED | OUTPATIENT
Start: 2019-04-08 | End: 2019-04-10

## 2019-04-08 NOTE — PROGRESS NOTES
"      Kristin Taylor is a 38 y.o. patient who presents for follow up of   Chief Complaint   Patient presents with   • Follow-up     37 yo est pt here for low libido. She says that she is experiencing vaginal dryness that is not relieved by lubrication. She is also having pain with sex and it is mostly with insertion. She says the pain is sharp. She denies any HF, night sweats or moodiness.         The following portions of the patient's history were reviewed and updated as appropriate: allergies, current medications and problem list.    Review of Systems   Genitourinary: Positive for dyspareunia, pelvic pain (with sex) and vaginal pain.   All other systems reviewed and are negative.      BP 94/66   Ht 154.9 cm (60.98\")   Wt 50.9 kg (112 lb 4.8 oz)   Breastfeeding? No   BMI 21.23 kg/m²     Physical Exam   Constitutional: She is oriented to person, place, and time. She appears well-developed and well-nourished.   HENT:   Head: Normocephalic and atraumatic.   Eyes: Conjunctivae are normal. No scleral icterus.   Neck: Neck supple. No thyromegaly present.   Abdominal: Soft. Bowel sounds are normal. She exhibits no distension and no mass. There is no tenderness. There is no rebound and no guarding. No hernia.   Genitourinary: Pelvic exam was performed with patient supine. There is no rash, tenderness, lesion or injury on the right labia. There is no rash, tenderness, lesion or injury on the left labia. Right adnexum displays no mass, no tenderness and no fullness. Left adnexum displays no mass, no tenderness and no fullness. There is tenderness in the vagina. No erythema or bleeding in the vagina. No foreign body in the vagina. No signs of injury around the vagina. No vaginal discharge found.   Genitourinary Comments: Moderate atrophy  Moderate LPS/OBI spasm   Neurological: She is alert and oriented to person, place, and time.   Skin: Skin is warm and dry.   Psychiatric: She has a normal mood and affect. Her behavior " is normal. Judgment and thought content normal.   Nursing note and vitals reviewed.    A/P:  1. Vaginal atrophy- Rx Estrace pv x2/week. Patient counseled that vaginal estrogen rings, creams and tablets are available and highly effective at treating local vaginal symptoms such as atrophy and vaginal dryness.  Vaginal estrogen does not cause uterine overgrowth and does not require a progestogen to protect the uterus.  Very small amounts of estrogen are absorbed systemically.  For patients with a history of an estrogen dependent cancer such as breast cancer, the decision to use local estrogen for local vaginal symptoms should be made after consultation with her oncologist.  Possible side effects include local irritation or burning and/or vaginal bleeding and should always be reported.    2. Dyspareunia- vaginismus on exam. Refer Sherron Amaya PT.   3. Low libido- we discussed that we first we have to get rid of the pain component before we address low libido, since pain is not conducive to a good libido. She does not have HF or other issues indicative of menopause, but if her symptoms do not get better after treatment, will draw labs.   4. RHM- RTO 10/2019 annual or prn.     Assessment/Plan   Kristin was seen today for follow-up.    Diagnoses and all orders for this visit:    Screening for condition  -     Cancel: POC Urinalysis Dipstick    Vaginal atrophy    Low libido    Vaginismus  -     Ambulatory Referral to Physical Therapy Pelvic Floor    Other orders  -     estradiol (ESTRACE) 0.1 MG/GM vaginal cream; Insert 1 g into the vagina 2 (Two) Times a Week.                   No Follow-up on file.      Nadine Emanuel MD    4/8/19  11:48 AM

## 2019-04-09 ENCOUNTER — TELEPHONE (OUTPATIENT)
Dept: OBSTETRICS AND GYNECOLOGY | Facility: CLINIC | Age: 39
End: 2019-04-09

## 2019-04-09 NOTE — TELEPHONE ENCOUNTER
The estrace cream is not covered. Patient must try 2 generics before they will cover. Can you send something else over to [facility].

## 2019-05-20 ENCOUNTER — TELEPHONE (OUTPATIENT)
Dept: OBSTETRICS AND GYNECOLOGY | Facility: CLINIC | Age: 39
End: 2019-05-20

## 2019-05-20 NOTE — TELEPHONE ENCOUNTER
Can we see if she is interested in the compounded estrogen vaginal cream? Can you call and tell her the price and see if she is interested? Thanks ROLANDO

## 2019-05-20 NOTE — TELEPHONE ENCOUNTER
Patient's estradiol cream and premarin cream were denied along with appeals. Is there anything else we can give the patient? She uses [facility]

## 2019-10-17 ENCOUNTER — OFFICE VISIT (OUTPATIENT)
Dept: OBSTETRICS AND GYNECOLOGY | Facility: CLINIC | Age: 39
End: 2019-10-17

## 2019-10-17 VITALS — BODY MASS INDEX: 21.06 KG/M2 | WEIGHT: 111.4 LBS | DIASTOLIC BLOOD PRESSURE: 60 MMHG | SYSTOLIC BLOOD PRESSURE: 98 MMHG

## 2019-10-17 DIAGNOSIS — Z01.419 ENCOUNTER FOR GYNECOLOGICAL EXAMINATION WITHOUT ABNORMAL FINDING: ICD-10-CM

## 2019-10-17 DIAGNOSIS — B00.9 HSV-2 INFECTION: ICD-10-CM

## 2019-10-17 DIAGNOSIS — N95.2 VAGINAL ATROPHY: Primary | ICD-10-CM

## 2019-10-17 PROCEDURE — 99395 PREV VISIT EST AGE 18-39: CPT | Performed by: OBSTETRICS & GYNECOLOGY

## 2019-10-17 RX ORDER — ESTRADIOL 10 UG/1
1 INSERT VAGINAL 2 TIMES WEEKLY
Qty: 8 TABLET | Refills: 11 | Status: SHIPPED | OUTPATIENT
Start: 2019-10-17 | End: 2021-06-11

## 2019-10-17 RX ORDER — VENLAFAXINE HYDROCHLORIDE 150 MG/1
CAPSULE, EXTENDED RELEASE ORAL
COMMUNITY
Start: 2019-10-14 | End: 2021-06-11

## 2019-10-17 RX ORDER — VALACYCLOVIR HYDROCHLORIDE 500 MG/1
500 TABLET, FILM COATED ORAL DAILY
Qty: 90 TABLET | Refills: 3 | Status: SHIPPED | OUTPATIENT
Start: 2019-10-17 | End: 2020-01-15
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
## 2021-06-11 ENCOUNTER — APPOINTMENT (OUTPATIENT)
Dept: GENERAL RADIOLOGY | Facility: HOSPITAL | Age: 41
End: 2021-06-11

## 2021-06-11 ENCOUNTER — APPOINTMENT (OUTPATIENT)
Dept: CT IMAGING | Facility: HOSPITAL | Age: 41
End: 2021-06-11

## 2021-06-11 ENCOUNTER — HOSPITAL ENCOUNTER (EMERGENCY)
Facility: HOSPITAL | Age: 41
Discharge: HOME | End: 2021-06-11
Attending: EMERGENCY MEDICINE | Admitting: EMERGENCY MEDICINE

## 2021-06-11 VITALS
HEIGHT: 61 IN | RESPIRATION RATE: 16 BRPM | WEIGHT: 100 LBS | DIASTOLIC BLOOD PRESSURE: 68 MMHG | BODY MASS INDEX: 18.88 KG/M2 | TEMPERATURE: 98.6 F | OXYGEN SATURATION: 98 % | SYSTOLIC BLOOD PRESSURE: 102 MMHG | HEART RATE: 85 BPM

## 2021-06-11 DIAGNOSIS — S70.02XA CONTUSION OF LEFT HIP AND THIGH, INITIAL ENCOUNTER: ICD-10-CM

## 2021-06-11 DIAGNOSIS — S29.012A STRAIN OF THORACIC BACK REGION: ICD-10-CM

## 2021-06-11 DIAGNOSIS — F43.0 ACUTE STRESS DISORDER: ICD-10-CM

## 2021-06-11 DIAGNOSIS — S70.12XA CONTUSION OF LEFT HIP AND THIGH, INITIAL ENCOUNTER: ICD-10-CM

## 2021-06-11 DIAGNOSIS — S00.83XA FACIAL CONTUSION, INITIAL ENCOUNTER: Primary | ICD-10-CM

## 2021-06-11 PROCEDURE — 99284 EMERGENCY DEPT VISIT MOD MDM: CPT | Performed by: PHYSICIAN ASSISTANT

## 2021-06-11 PROCEDURE — 99282 EMERGENCY DEPT VISIT SF MDM: CPT

## 2021-06-11 PROCEDURE — 72125 CT NECK SPINE W/O DYE: CPT

## 2021-06-11 PROCEDURE — 72072 X-RAY EXAM THORAC SPINE 3VWS: CPT

## 2021-06-11 PROCEDURE — 70486 CT MAXILLOFACIAL W/O DYE: CPT

## 2021-06-11 PROCEDURE — 73502 X-RAY EXAM HIP UNI 2-3 VIEWS: CPT

## 2021-06-11 PROCEDURE — 70450 CT HEAD/BRAIN W/O DYE: CPT

## 2021-06-11 RX ORDER — HYDROXYZINE HYDROCHLORIDE 25 MG/1
25 TABLET, FILM COATED ORAL EVERY 8 HOURS PRN
Qty: 21 TABLET | Refills: 0 | Status: SHIPPED | OUTPATIENT
Start: 2021-06-11 | End: 2023-02-14
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
## 2021-11-24 RX ORDER — VALACYCLOVIR HYDROCHLORIDE 500 MG/1
500 TABLET, FILM COATED ORAL DAILY
Qty: 30 TABLET | Refills: 3 | Status: SHIPPED | OUTPATIENT
Start: 2021-11-24 | End: 2021-12-24

## 2022-04-07 ENCOUNTER — TRANSCRIBE ORDERS (OUTPATIENT)
Dept: ADMINISTRATIVE | Facility: HOSPITAL | Age: 42
End: 2022-04-07

## 2022-04-07 DIAGNOSIS — M54.50 LOW BACK PAIN, UNSPECIFIED BACK PAIN LATERALITY, UNSPECIFIED CHRONICITY, UNSPECIFIED WHETHER SCIATICA PRESENT: Primary | ICD-10-CM

## 2022-04-16 ENCOUNTER — APPOINTMENT (OUTPATIENT)
Dept: CT IMAGING | Facility: HOSPITAL | Age: 42
End: 2022-04-16

## 2022-04-16 ENCOUNTER — HOSPITAL ENCOUNTER (OUTPATIENT)
Facility: HOSPITAL | Age: 42
Setting detail: OBSERVATION
Discharge: HOME | End: 2022-04-19
Attending: EMERGENCY MEDICINE | Admitting: HOSPITALIST

## 2022-04-16 DIAGNOSIS — N12 PYELONEPHRITIS: Primary | ICD-10-CM

## 2022-04-16 LAB
ALBUMIN SERPL-MCNC: 3.8 G/DL (ref 3.5–5.2)
ALBUMIN/GLOB SERPL: 1.5 G/DL
ALP SERPL-CCNC: 74 U/L (ref 39–117)
ALT SERPL W P-5'-P-CCNC: 14 U/L (ref 1–33)
AMPHET+METHAMPHET UR QL: NEGATIVE
AMPHETAMINES UR QL: NEGATIVE
ANION GAP SERPL CALCULATED.3IONS-SCNC: 10.3 MMOL/L (ref 5–15)
APTT PPP: 31.2 SECONDS (ref 24.3–38.1)
AST SERPL-CCNC: 16 U/L (ref 1–32)
B-HCG UR QL: NEGATIVE
BACTERIA UR QL AUTO: ABNORMAL /HPF
BARBITURATES UR QL SCN: NEGATIVE
BASOPHILS # BLD AUTO: 0.04 10*3/MM3 (ref 0–0.2)
BASOPHILS NFR BLD AUTO: 0.3 % (ref 0–1.5)
BENZODIAZ UR QL SCN: NEGATIVE
BILIRUB SERPL-MCNC: 0.4 MG/DL (ref 0–1.2)
BILIRUB UR QL STRIP: NEGATIVE
BUN SERPL-MCNC: 7 MG/DL (ref 6–20)
BUN/CREAT SERPL: 7.6 (ref 7–25)
BUPRENORPHINE SERPL-MCNC: NEGATIVE NG/ML
CALCIUM SPEC-SCNC: 8.6 MG/DL (ref 8.6–10.5)
CANNABINOIDS SERPL QL: NEGATIVE
CHLORIDE SERPL-SCNC: 103 MMOL/L (ref 98–107)
CLARITY UR: ABNORMAL
CO2 SERPL-SCNC: 23.7 MMOL/L (ref 22–29)
COCAINE UR QL: NEGATIVE
COLOR UR: YELLOW
CREAT SERPL-MCNC: 0.92 MG/DL (ref 0.57–1)
D-LACTATE SERPL-SCNC: 1 MMOL/L (ref 0.5–2)
DEPRECATED RDW RBC AUTO: 47.8 FL (ref 37–54)
EGFRCR SERPLBLD CKD-EPI 2021: 81.4 ML/MIN/1.73
EOSINOPHIL # BLD AUTO: 0.11 10*3/MM3 (ref 0–0.4)
EOSINOPHIL NFR BLD AUTO: 0.7 % (ref 0.3–6.2)
ERYTHROCYTE [DISTWIDTH] IN BLOOD BY AUTOMATED COUNT: 13.4 % (ref 12.3–15.4)
GLOBULIN UR ELPH-MCNC: 2.5 GM/DL
GLUCOSE SERPL-MCNC: 95 MG/DL (ref 65–99)
GLUCOSE UR STRIP-MCNC: NEGATIVE MG/DL
HCT VFR BLD AUTO: 38 % (ref 34–46.6)
HGB BLD-MCNC: 12.3 G/DL (ref 12–15.9)
HGB UR QL STRIP.AUTO: ABNORMAL
HYALINE CASTS UR QL AUTO: ABNORMAL /LPF
IMM GRANULOCYTES # BLD AUTO: 0.03 10*3/MM3 (ref 0–0.05)
IMM GRANULOCYTES NFR BLD AUTO: 0.2 % (ref 0–0.5)
INR PPP: 0.95 (ref 0.9–1.1)
KETONES UR QL STRIP: NEGATIVE
LEUKOCYTE ESTERASE UR QL STRIP.AUTO: ABNORMAL
LYMPHOCYTES # BLD AUTO: 1.05 10*3/MM3 (ref 0.7–3.1)
LYMPHOCYTES NFR BLD AUTO: 6.6 % (ref 19.6–45.3)
MCH RBC QN AUTO: 30.8 PG (ref 26.6–33)
MCHC RBC AUTO-ENTMCNC: 32.4 G/DL (ref 31.5–35.7)
MCV RBC AUTO: 95.2 FL (ref 79–97)
METHADONE UR QL SCN: NEGATIVE
MONOCYTES # BLD AUTO: 0.96 10*3/MM3 (ref 0.1–0.9)
MONOCYTES NFR BLD AUTO: 6.1 % (ref 5–12)
NEUTROPHILS NFR BLD AUTO: 13.61 10*3/MM3 (ref 1.7–7)
NEUTROPHILS NFR BLD AUTO: 86.1 % (ref 42.7–76)
NITRITE UR QL STRIP: POSITIVE
OPIATES UR QL: NEGATIVE
OXYCODONE UR QL SCN: NEGATIVE
PCP UR QL SCN: NEGATIVE
PH UR STRIP.AUTO: 7 [PH] (ref 4.5–8)
PLATELET # BLD AUTO: 240 10*3/MM3 (ref 140–450)
PMV BLD AUTO: 11.2 FL (ref 6–12)
POTASSIUM SERPL-SCNC: 4.6 MMOL/L (ref 3.5–5.2)
PROPOXYPH UR QL: NEGATIVE
PROT SERPL-MCNC: 6.3 G/DL (ref 6–8.5)
PROT UR QL STRIP: ABNORMAL
PROTHROMBIN TIME: 12.9 SECONDS (ref 12.1–15)
RBC # BLD AUTO: 3.99 10*6/MM3 (ref 3.77–5.28)
RBC # UR STRIP: ABNORMAL /HPF
REF LAB TEST METHOD: ABNORMAL
SARS-COV-2 RNA PNL SPEC NAA+PROBE: NOT DETECTED
SODIUM SERPL-SCNC: 137 MMOL/L (ref 136–145)
SP GR UR STRIP: 1.02 (ref 1–1.03)
SQUAMOUS #/AREA URNS HPF: ABNORMAL /HPF
TRICYCLICS UR QL SCN: NEGATIVE
UROBILINOGEN UR QL STRIP: ABNORMAL
WBC # UR STRIP: ABNORMAL /HPF
WBC NRBC COR # BLD: 15.8 10*3/MM3 (ref 3.4–10.8)

## 2022-04-16 PROCEDURE — 87077 CULTURE AEROBIC IDENTIFY: CPT | Performed by: EMERGENCY MEDICINE

## 2022-04-16 PROCEDURE — 96375 TX/PRO/DX INJ NEW DRUG ADDON: CPT

## 2022-04-16 PROCEDURE — 81025 URINE PREGNANCY TEST: CPT | Performed by: EMERGENCY MEDICINE

## 2022-04-16 PROCEDURE — 83605 ASSAY OF LACTIC ACID: CPT | Performed by: EMERGENCY MEDICINE

## 2022-04-16 PROCEDURE — 99284 EMERGENCY DEPT VISIT MOD MDM: CPT

## 2022-04-16 PROCEDURE — 25010000002 PIPERACILLIN SOD-TAZOBACTAM PER 1 G: Performed by: EMERGENCY MEDICINE

## 2022-04-16 PROCEDURE — 0 IOPAMIDOL PER 1 ML: Performed by: EMERGENCY MEDICINE

## 2022-04-16 PROCEDURE — 99284 EMERGENCY DEPT VISIT MOD MDM: CPT | Performed by: EMERGENCY MEDICINE

## 2022-04-16 PROCEDURE — 81001 URINALYSIS AUTO W/SCOPE: CPT | Performed by: EMERGENCY MEDICINE

## 2022-04-16 PROCEDURE — 25010000002 KETOROLAC TROMETHAMINE PER 15 MG: Performed by: INTERNAL MEDICINE

## 2022-04-16 PROCEDURE — G0378 HOSPITAL OBSERVATION PER HR: HCPCS

## 2022-04-16 PROCEDURE — 80306 DRUG TEST PRSMV INSTRMNT: CPT | Performed by: INTERNAL MEDICINE

## 2022-04-16 PROCEDURE — 87635 SARS-COV-2 COVID-19 AMP PRB: CPT | Performed by: EMERGENCY MEDICINE

## 2022-04-16 PROCEDURE — 99219 PR INITIAL OBSERVATION CARE/DAY 50 MINUTES: CPT | Performed by: INTERNAL MEDICINE

## 2022-04-16 PROCEDURE — 87040 BLOOD CULTURE FOR BACTERIA: CPT | Performed by: EMERGENCY MEDICINE

## 2022-04-16 PROCEDURE — 80053 COMPREHEN METABOLIC PANEL: CPT | Performed by: EMERGENCY MEDICINE

## 2022-04-16 PROCEDURE — 74177 CT ABD & PELVIS W/CONTRAST: CPT

## 2022-04-16 PROCEDURE — 96361 HYDRATE IV INFUSION ADD-ON: CPT

## 2022-04-16 PROCEDURE — 87086 URINE CULTURE/COLONY COUNT: CPT | Performed by: EMERGENCY MEDICINE

## 2022-04-16 PROCEDURE — 87186 SC STD MICRODIL/AGAR DIL: CPT | Performed by: EMERGENCY MEDICINE

## 2022-04-16 PROCEDURE — 25010000002 ONDANSETRON PER 1 MG: Performed by: EMERGENCY MEDICINE

## 2022-04-16 PROCEDURE — 36415 COLL VENOUS BLD VENIPUNCTURE: CPT

## 2022-04-16 PROCEDURE — 25010000002 HYDROMORPHONE PER 4 MG: Performed by: EMERGENCY MEDICINE

## 2022-04-16 PROCEDURE — 85730 THROMBOPLASTIN TIME PARTIAL: CPT | Performed by: EMERGENCY MEDICINE

## 2022-04-16 PROCEDURE — 96365 THER/PROPH/DIAG IV INF INIT: CPT

## 2022-04-16 PROCEDURE — 85025 COMPLETE CBC W/AUTO DIFF WBC: CPT | Performed by: EMERGENCY MEDICINE

## 2022-04-16 PROCEDURE — 85610 PROTHROMBIN TIME: CPT | Performed by: EMERGENCY MEDICINE

## 2022-04-16 PROCEDURE — C9803 HOPD COVID-19 SPEC COLLECT: HCPCS

## 2022-04-16 RX ORDER — SODIUM CHLORIDE 0.9 % (FLUSH) 0.9 %
10 SYRINGE (ML) INJECTION EVERY 12 HOURS SCHEDULED
Status: DISCONTINUED | OUTPATIENT
Start: 2022-04-16 | End: 2022-04-19 | Stop reason: HOSPADM

## 2022-04-16 RX ORDER — ACETAMINOPHEN 325 MG/1
650 TABLET ORAL EVERY 4 HOURS PRN
Status: DISCONTINUED | OUTPATIENT
Start: 2022-04-16 | End: 2022-04-19 | Stop reason: HOSPADM

## 2022-04-16 RX ORDER — ONDANSETRON 2 MG/ML
4 INJECTION INTRAMUSCULAR; INTRAVENOUS ONCE
Status: COMPLETED | OUTPATIENT
Start: 2022-04-16 | End: 2022-04-16

## 2022-04-16 RX ORDER — ACETAMINOPHEN 500 MG
1000 TABLET ORAL ONCE
Status: COMPLETED | OUTPATIENT
Start: 2022-04-16 | End: 2022-04-16

## 2022-04-16 RX ORDER — KETOROLAC TROMETHAMINE 30 MG/ML
30 INJECTION, SOLUTION INTRAMUSCULAR; INTRAVENOUS EVERY 6 HOURS PRN
Status: DISPENSED | OUTPATIENT
Start: 2022-04-16 | End: 2022-04-18

## 2022-04-16 RX ORDER — HYDROMORPHONE HCL 110MG/55ML
0.5 PATIENT CONTROLLED ANALGESIA SYRINGE INTRAVENOUS ONCE
Status: COMPLETED | OUTPATIENT
Start: 2022-04-16 | End: 2022-04-16

## 2022-04-16 RX ORDER — DULOXETIN HYDROCHLORIDE 60 MG/1
60 CAPSULE, DELAYED RELEASE ORAL DAILY
COMMUNITY
End: 2023-02-14

## 2022-04-16 RX ORDER — SODIUM CHLORIDE 9 MG/ML
150 INJECTION, SOLUTION INTRAVENOUS CONTINUOUS
Status: DISCONTINUED | OUTPATIENT
Start: 2022-04-16 | End: 2022-04-17

## 2022-04-16 RX ORDER — ACETAMINOPHEN 650 MG/1
650 SUPPOSITORY RECTAL EVERY 4 HOURS PRN
Status: DISCONTINUED | OUTPATIENT
Start: 2022-04-16 | End: 2022-04-19 | Stop reason: HOSPADM

## 2022-04-16 RX ORDER — SODIUM CHLORIDE 0.9 % (FLUSH) 0.9 %
10 SYRINGE (ML) INJECTION AS NEEDED
Status: DISCONTINUED | OUTPATIENT
Start: 2022-04-16 | End: 2022-04-19 | Stop reason: HOSPADM

## 2022-04-16 RX ORDER — BUPROPION HYDROCHLORIDE 150 MG/1
150 TABLET ORAL DAILY
COMMUNITY
End: 2023-02-14

## 2022-04-16 RX ORDER — ONDANSETRON 4 MG/1
4 TABLET, FILM COATED ORAL EVERY 6 HOURS PRN
Status: DISCONTINUED | OUTPATIENT
Start: 2022-04-16 | End: 2022-04-19 | Stop reason: HOSPADM

## 2022-04-16 RX ORDER — HYDROXYZINE HYDROCHLORIDE 25 MG/1
25 TABLET, FILM COATED ORAL EVERY 8 HOURS PRN
Status: DISCONTINUED | OUTPATIENT
Start: 2022-04-16 | End: 2022-04-19 | Stop reason: HOSPADM

## 2022-04-16 RX ORDER — PANTOPRAZOLE SODIUM 40 MG/1
40 TABLET, DELAYED RELEASE ORAL EVERY MORNING
Status: DISCONTINUED | OUTPATIENT
Start: 2022-04-17 | End: 2022-04-19 | Stop reason: HOSPADM

## 2022-04-16 RX ORDER — ACETAMINOPHEN 160 MG/5ML
650 SOLUTION ORAL EVERY 4 HOURS PRN
Status: DISCONTINUED | OUTPATIENT
Start: 2022-04-16 | End: 2022-04-19 | Stop reason: HOSPADM

## 2022-04-16 RX ORDER — CHOLECALCIFEROL (VITAMIN D3) 125 MCG
5 CAPSULE ORAL NIGHTLY PRN
Status: DISCONTINUED | OUTPATIENT
Start: 2022-04-16 | End: 2022-04-19 | Stop reason: HOSPADM

## 2022-04-16 RX ORDER — SODIUM CHLORIDE 9 MG/ML
40 INJECTION, SOLUTION INTRAVENOUS AS NEEDED
Status: DISCONTINUED | OUTPATIENT
Start: 2022-04-16 | End: 2022-04-19 | Stop reason: HOSPADM

## 2022-04-16 RX ORDER — CEFTRIAXONE 1 G/50ML
1 INJECTION, SOLUTION INTRAVENOUS ONCE
Status: DISCONTINUED | OUTPATIENT
Start: 2022-04-16 | End: 2022-04-16

## 2022-04-16 RX ORDER — ONDANSETRON 2 MG/ML
4 INJECTION INTRAMUSCULAR; INTRAVENOUS EVERY 6 HOURS PRN
Status: DISCONTINUED | OUTPATIENT
Start: 2022-04-16 | End: 2022-04-19 | Stop reason: HOSPADM

## 2022-04-16 RX ORDER — ALUMINA, MAGNESIA, AND SIMETHICONE 2400; 2400; 240 MG/30ML; MG/30ML; MG/30ML
15 SUSPENSION ORAL EVERY 6 HOURS PRN
Status: DISCONTINUED | OUTPATIENT
Start: 2022-04-16 | End: 2022-04-19 | Stop reason: HOSPADM

## 2022-04-16 RX ADMIN — SODIUM CHLORIDE 1000 ML: 9 INJECTION, SOLUTION INTRAVENOUS at 15:44

## 2022-04-16 RX ADMIN — SODIUM CHLORIDE 150 ML/HR: 9 INJECTION, SOLUTION INTRAVENOUS at 18:22

## 2022-04-16 RX ADMIN — HYDROMORPHONE HYDROCHLORIDE 0.5 MG: 2 INJECTION, SOLUTION INTRAMUSCULAR; INTRAVENOUS; SUBCUTANEOUS at 15:50

## 2022-04-16 RX ADMIN — Medication 10 ML: at 21:35

## 2022-04-16 RX ADMIN — SODIUM CHLORIDE 3.38 G: 900 INJECTION, SOLUTION INTRAVENOUS at 17:42

## 2022-04-16 RX ADMIN — ONDANSETRON 4 MG: 2 INJECTION INTRAMUSCULAR; INTRAVENOUS at 15:50

## 2022-04-16 RX ADMIN — KETOROLAC TROMETHAMINE 30 MG: 30 INJECTION, SOLUTION INTRAMUSCULAR; INTRAVENOUS at 21:34

## 2022-04-16 RX ADMIN — IOPAMIDOL 100 ML: 755 INJECTION, SOLUTION INTRAVENOUS at 16:37

## 2022-04-16 RX ADMIN — HYDROXYZINE HYDROCHLORIDE 25 MG: 25 TABLET ORAL at 21:35

## 2022-04-16 RX ADMIN — ACETAMINOPHEN 1000 MG: 500 TABLET ORAL at 15:51

## 2022-04-16 NOTE — ED PROVIDER NOTES
Subjective   History of Present Illness  History of Present Illness    Chief complaint: Abdominal pain    Location: Right lower quadrant    Quality/Severity: Moderate to severe pain    Timing/Duration: Worsening for the past 3 days    Modifying Factors: Worse with any movements or ambulation    Narrative: This patient presents for evaluation of worsening pain in the right abdomen and flank area.  The pain began 3 days ago and has progressively worsened.  She developed a fever today.  She could not get up and walk around or get herself out of the car because of the increasing pain today.  She does have a history of pyelonephritis that required hospitalization in the past.  She denies any dysuria or hematuria symptoms at this time.  She says that the abdomen feels swollen in the affected area on the right side.  She has had some nausea but no vomiting.  Bowel movements have been normal.    Associated Symptoms: As above    Review of Systems   Constitutional: Positive for fatigue and fever. Negative for activity change.   HENT: Negative.    Eyes: Negative for pain and visual disturbance.   Respiratory: Negative for cough and shortness of breath.    Cardiovascular: Negative for chest pain.   Gastrointestinal: Positive for abdominal pain and nausea. Negative for blood in stool.   Genitourinary: Positive for flank pain. Negative for dysuria, frequency and hematuria.   Skin: Negative for color change, rash and wound.   Neurological: Negative for syncope and headaches.   All other systems reviewed and are negative.      Past Medical History:   Diagnosis Date   • Abnormal Pap smear of cervix     1 abnl wiht nl f/u   • Anxiety    • Bacterial vaginosis    • Depression    • Herpes     + HSV serology, no outbreaks   • Urinary tract infection        Allergies   Allergen Reactions   • Hydrocodone-Acetaminophen Nausea And Vomiting     PT ON NORCO 5 AND TOLERATES FINE, BUT REPORT VICODIN CAUSED N/V AFTER DENTAL EXTRACTION   •  Tramadol Nausea And Vomiting       Past Surgical History:   Procedure Laterality Date   • BACK SURGERY     • D & C WITH SUCTION      SAB x1   • HYSTERECTOMY  08/2015    TLH/BS with OC   • KNEE ARTHROSCOPY     • SHOULDER LIGAMENT REPAIR     • TUBAL ABDOMINAL LIGATION  2012       Family History   Problem Relation Age of Onset   • Breast cancer Neg Hx    • Ovarian cancer Neg Hx    • Colon cancer Neg Hx    • Prostate cancer Neg Hx    • Deep vein thrombosis Neg Hx        Social History     Socioeconomic History   • Marital status: Single   Tobacco Use   • Smoking status: Smoker, Current Status Unknown     Packs/day: 0.50     Years: 6.00     Pack years: 3.00     Types: Cigarettes   • Smokeless tobacco: Never Used   • Tobacco comment: quit 2 years, but started back recently   Vaping Use   • Vaping Use: Former   Substance and Sexual Activity   • Alcohol use: No   • Drug use: Yes   • Sexual activity: Yes     Partners: Male     Birth control/protection: Surgical     Comment: THL w/OC     ED Triage Vitals [04/16/22 1532]   Temp Heart Rate Resp BP SpO2   (!) 100.7 °F (38.2 °C) 104 17 108/61 100 %      Temp src Heart Rate Source Patient Position BP Location FiO2 (%)   Oral Apical Lying Right arm --     Objective   Physical Exam  Vitals and nursing note reviewed.   Constitutional:       Appearance: She is well-developed. She is ill-appearing. She is not toxic-appearing or diaphoretic.   HENT:      Head: Normocephalic and atraumatic.      Mouth/Throat:      Mouth: Mucous membranes are moist.   Eyes:      General:         Right eye: No discharge.         Left eye: No discharge.      Pupils: Pupils are equal, round, and reactive to light.   Cardiovascular:      Rate and Rhythm: Normal rate and regular rhythm.      Heart sounds: Normal heart sounds. No murmur heard.  Pulmonary:      Effort: Pulmonary effort is normal. No respiratory distress.      Breath sounds: Normal breath sounds. No stridor. No rhonchi.   Abdominal:      General:  Bowel sounds are normal.      Palpations: Abdomen is soft.      Tenderness: There is abdominal tenderness in the right lower quadrant, periumbilical area and suprapubic area. There is guarding and rebound. Negative signs include Bruce's sign, Rovsing's sign and psoas sign.      Hernia: No hernia is present.   Musculoskeletal:         General: No deformity. Normal range of motion.      Cervical back: Normal range of motion and neck supple.   Skin:     General: Skin is warm and dry.      Findings: No erythema or rash.   Neurological:      General: No focal deficit present.      Mental Status: She is alert and oriented to person, place, and time.   Psychiatric:         Behavior: Behavior normal.         Thought Content: Thought content normal.         Judgment: Judgment normal.         Results for orders placed or performed during the hospital encounter of 04/16/22   Comprehensive Metabolic Panel    Specimen: Blood   Result Value Ref Range    Glucose 95 65 - 99 mg/dL    BUN 7 6 - 20 mg/dL    Creatinine 0.92 0.57 - 1.00 mg/dL    Sodium 137 136 - 145 mmol/L    Potassium 4.6 3.5 - 5.2 mmol/L    Chloride 103 98 - 107 mmol/L    CO2 23.7 22.0 - 29.0 mmol/L    Calcium 8.6 8.6 - 10.5 mg/dL    Total Protein 6.3 6.0 - 8.5 g/dL    Albumin 3.80 3.50 - 5.20 g/dL    ALT (SGPT) 14 1 - 33 U/L    AST (SGOT) 16 1 - 32 U/L    Alkaline Phosphatase 74 39 - 117 U/L    Total Bilirubin 0.4 0.0 - 1.2 mg/dL    Globulin 2.5 gm/dL    A/G Ratio 1.5 g/dL    BUN/Creatinine Ratio 7.6 7.0 - 25.0    Anion Gap 10.3 5.0 - 15.0 mmol/L    eGFR 81.4 >60.0 mL/min/1.73   Protime-INR    Specimen: Blood   Result Value Ref Range    Protime 12.9 12.1 - 15.0 Seconds    INR 0.95 0.90 - 1.10   aPTT    Specimen: Blood   Result Value Ref Range    PTT 31.2 24.3 - 38.1 seconds   Pregnancy, Urine - Urine, Clean Catch    Specimen: Urine, Clean Catch   Result Value Ref Range    HCG, Urine QL Negative Negative   Urinalysis With Culture If Indicated - Urine, Clean Catch     Specimen: Urine, Clean Catch   Result Value Ref Range    Color, UA Yellow Yellow, Straw    Appearance, UA Cloudy (A) Clear    pH, UA 7.0 4.5 - 8.0    Specific Gravity, UA 1.020 1.003 - 1.030    Glucose, UA Negative Negative    Ketones, UA Negative Negative    Bilirubin, UA Negative Negative    Blood, UA Moderate (2+) (A) Negative    Protein,  mg/dL (2+) (A) Negative    Leuk Esterase, UA Moderate (2+) (A) Negative    Nitrite, UA Positive (A) Negative    Urobilinogen, UA 0.2 E.U./dL 0.2 - 1.0 E.U./dL   CBC Auto Differential    Specimen: Blood   Result Value Ref Range    WBC 15.80 (H) 3.40 - 10.80 10*3/mm3    RBC 3.99 3.77 - 5.28 10*6/mm3    Hemoglobin 12.3 12.0 - 15.9 g/dL    Hematocrit 38.0 34.0 - 46.6 %    MCV 95.2 79.0 - 97.0 fL    MCH 30.8 26.6 - 33.0 pg    MCHC 32.4 31.5 - 35.7 g/dL    RDW 13.4 12.3 - 15.4 %    RDW-SD 47.8 37.0 - 54.0 fl    MPV 11.2 6.0 - 12.0 fL    Platelets 240 140 - 450 10*3/mm3    Neutrophil % 86.1 (H) 42.7 - 76.0 %    Lymphocyte % 6.6 (L) 19.6 - 45.3 %    Monocyte % 6.1 5.0 - 12.0 %    Eosinophil % 0.7 0.3 - 6.2 %    Basophil % 0.3 0.0 - 1.5 %    Immature Grans % 0.2 0.0 - 0.5 %    Neutrophils, Absolute 13.61 (H) 1.70 - 7.00 10*3/mm3    Lymphocytes, Absolute 1.05 0.70 - 3.10 10*3/mm3    Monocytes, Absolute 0.96 (H) 0.10 - 0.90 10*3/mm3    Eosinophils, Absolute 0.11 0.00 - 0.40 10*3/mm3    Basophils, Absolute 0.04 0.00 - 0.20 10*3/mm3    Immature Grans, Absolute 0.03 0.00 - 0.05 10*3/mm3   Urinalysis, Microscopic Only - Urine, Clean Catch    Specimen: Urine, Clean Catch   Result Value Ref Range    RBC, UA 21-30 (A) None Seen /HPF    WBC, UA 13-20 (A) None Seen /HPF    Bacteria, UA 1+ (A) None Seen /HPF    Squamous Epithelial Cells, UA 0-2 None Seen, 0-2 /HPF    Hyaline Casts, UA None Seen None Seen /LPF    Methodology Manual Light Microscopy    Lactic Acid, Plasma    Specimen: Blood   Result Value Ref Range    Lactate 1.0 0.5 - 2.0 mmol/L     RADIOLOGY        Study: CT abdomen and  pelvis with contrast    Findings: 1.  Right renal pyelonephritis and likely cystitis. No evidence of abscess or additional complicating feature. No evidence of urinary obstruction.  2.  Hysterectomy.    Interpreted contemporaneously with treatment by Dr. Max, independently viewed by me    Procedures           ED Course  ED Course as of 04/16/22 1759   Sat Apr 16, 2022   1727 I reviewed the labs and the CT from today's visit.  Patient has pyelonephritis and her overall presentation triggers positive for sepsis screening.  She has required hospitalization in the past for pyelonephritis due to antibiotic resistant UTI.  Therefore we will start her on IV Zosyn here and admit for observation tonight.  Patient is agreeable to this plan. [WENDY]      ED Course User Index  [WENDY] Jamshid Beckett MD                                                 MDM  Number of Diagnoses or Management Options     Amount and/or Complexity of Data Reviewed  Clinical lab tests: ordered and reviewed  Tests in the radiology section of CPT®: ordered and reviewed  Discuss the patient with other providers: yes (Dr. Singh, hospitalist)  Independent visualization of images, tracings, or specimens: yes    Risk of Complications, Morbidity, and/or Mortality  Presenting problems: moderate  Diagnostic procedures: moderate  Management options: moderate        Final diagnoses:   Pyelonephritis       ED Disposition  ED Disposition     ED Disposition   Decision to Admit    Condition   --    Comment   Level of Care: Med/Surg [1]   Diagnosis: Pyelonephritis [705056]   Admitting Physician: MILY SINGH [[id number]]   Attending Physician: MILY SINGH [[id number]]               No follow-up provider specified.       Medication List      No changes were made to your prescriptions during this visit.          Jamshid Beckett MD  04/16/22 2831

## 2022-04-16 NOTE — H&P
[facility]     Wilton Brice MD    CHIEF COMPLAINT:     Right flank pain    HISTORY OF PRESENT ILLNESS:    41-year-old female started having some right-sided flank pain starting on Thursday.  Symptoms have progressed to somewhat severe sharp stabbing 7-8 out of 10 right flank and suprapubic pain that have been associated with fevers at home.  She seemed to be getting progressively weak.  She been having some nausea but had no vomiting.  Since issues were worsening came to the ER noted to have fever while there.  Given IV fluids lab work obtained had abnormal CT of her abdomen and thus admission was requested for further care.  Chart was reviewed had a similar presentation for pyelonephritis in 2018.      Past Medical History:   Diagnosis Date   • Abnormal Pap smear of cervix     1 abnl wiht nl f/u   • Anxiety    • Bacterial vaginosis    • Depression    • Herpes     + HSV serology, no outbreaks   • Urinary tract infection      Past Surgical History:   Procedure Laterality Date   • BACK SURGERY     • D & C WITH SUCTION      SAB x1   • HYSTERECTOMY  08/2015    TLH/BS with OC   • KNEE ARTHROSCOPY     • SHOULDER LIGAMENT REPAIR     • TUBAL ABDOMINAL LIGATION  2012     Family History   Problem Relation Age of Onset   • Breast cancer Neg Hx    • Ovarian cancer Neg Hx    • Colon cancer Neg Hx    • Prostate cancer Neg Hx    • Deep vein thrombosis Neg Hx      Social History     Tobacco Use   • Smoking status: Smoker, Current Status Unknown     Packs/day: 0.50     Years: 6.00     Pack years: 3.00     Types: Cigarettes   • Smokeless tobacco: Never Used   • Tobacco comment: quit 2 years, but started back recently   Vaping Use   • Vaping Use: Former   Substance Use Topics   • Alcohol use: No   • Drug use: Yes     (Not in a hospital admission)    Allergies:  Hydrocodone-acetaminophen and Tramadol    Immunization History   Administered Date(s) Administered   • COVID-19 (PFIZER) PURPLE CAP 07/06/2021  "          REVIEW OF SYSTEMS:  Please see the above history of present illness for pertinent positives and negatives.  The remainder of the patient's systems have been reviewed and are negative.     Objective     Vital Signs  Temp:  [100.7 °F (38.2 °C)] 100.7 °F (38.2 °C)  Heart Rate:  [] 97  Resp:  [16-17] 16  BP: ()/(53-61) 96/53    Flowsheet Rows    Flowsheet Row First Filed Value   Admission Height 154.9 cm (61\") Documented at 04/16/2022 1532   Admission Weight 57.2 kg (126 lb) Documented at 04/16/2022 1532           Physical Exam:  Physical Exam  Vitals reviewed.   Constitutional:       Appearance: She is ill-appearing.   HENT:      Head: Normocephalic.   Eyes:      Pupils: Pupils are equal, round, and reactive to light.   Cardiovascular:      Rate and Rhythm: Normal rate.   Pulmonary:      Effort: Pulmonary effort is normal. No respiratory distress.   Abdominal:      General: There is no distension.      Palpations: Abdomen is soft.      Tenderness: There is right CVA tenderness. There is no guarding.   Musculoskeletal:      Right lower leg: No edema.      Left lower leg: No edema.   Skin:     General: Skin is warm and dry.   Neurological:      Mental Status: She is alert and oriented to person, place, and time.   Psychiatric:         Mood and Affect: Mood normal.           Results Review:    I reviewed the patient's new clinical results.  Lab Results (most recent)     Procedure Component Value Units Date/Time    Comprehensive Metabolic Panel [[id number]] Collected: 04/16/22 1544    Specimen: Blood Updated: 04/16/22 1617     Glucose 95 mg/dL      BUN 7 mg/dL      Creatinine 0.92 mg/dL      Sodium 137 mmol/L      Potassium 4.6 mmol/L      Chloride 103 mmol/L      CO2 23.7 mmol/L      Calcium 8.6 mg/dL      Total Protein 6.3 g/dL      Albumin 3.80 g/dL      ALT (SGPT) 14 U/L      AST (SGOT) 16 U/L      Alkaline Phosphatase 74 U/L      Total Bilirubin 0.4 mg/dL      Globulin 2.5 gm/dL      A/G Ratio 1.5 " g/dL      BUN/Creatinine Ratio 7.6     Anion Gap 10.3 mmol/L      eGFR 81.4 mL/min/1.73      Comment: National Kidney Foundation and American Society of Nephrology (ASN) Task Force recommended calculation based on the Chronic Kidney Disease Epidemiology Collaboration (CKD-EPI) equation refit without adjustment for race.       Narrative:      GFR Normal >60  Chronic Kidney Disease <60  Kidney Failure <15      Urinalysis With Culture If Indicated - Urine, Clean Catch [[id number]]  (Abnormal) Collected: 04/16/22 1544    Specimen: Urine, Clean Catch Updated: 04/16/22 1612     Color, UA Yellow     Appearance, UA Cloudy     pH, UA 7.0     Specific Gravity, UA 1.020     Glucose, UA Negative     Ketones, UA Negative     Bilirubin, UA Negative     Blood, UA Moderate (2+)     Protein,  mg/dL (2+)     Leuk Esterase, UA Moderate (2+)     Nitrite, UA Positive     Urobilinogen, UA 0.2 E.U./dL    Urinalysis, Microscopic Only - Urine, Clean Catch [[id number]] Collected: 04/16/22 1544    Specimen: Urine, Clean Catch Updated: 04/16/22 1609    Pregnancy, Urine - Urine, Clean Catch [[id number]]  (Normal) Collected: 04/16/22 1544    Specimen: Urine, Clean Catch Updated: 04/16/22 1608     HCG, Urine QL Negative    Protime-INR [[id number]]  (Normal) Collected: 04/16/22 1544    Specimen: Blood Updated: 04/16/22 1608     Protime 12.9 Seconds      INR 0.95    Narrative:      Therapeutic Ranges for INR: 2.0-3.0 (PT 20-30)                              2.5-3.5 (PT 25-34)    aPTT [[id number]]  (Normal) Collected: 04/16/22 1544    Specimen: Blood Updated: 04/16/22 1608     PTT 31.2 seconds     Narrative:      PTT = The equivalent PTT values for the therapeutic range of heparin levels at 0.1 to 0.7 U/ml are 53 to 110 seconds.      CBC & Differential [[id number]]  (Abnormal) Collected: 04/16/22 1544    Specimen: Blood Updated: 04/16/22 1603    Narrative:      The following orders were created for panel order CBC & Differential.  Procedure                                Abnormality         Status                     ---------                               -----------         ------                     CBC Auto Differential[[id number]]        Abnormal            Final result                 Please view results for these tests on the individual orders.    CBC Auto Differential [[id number]]  (Abnormal) Collected: 04/16/22 1544    Specimen: Blood Updated: 04/16/22 1603     WBC 15.80 10*3/mm3      RBC 3.99 10*6/mm3      Hemoglobin 12.3 g/dL      Hematocrit 38.0 %      MCV 95.2 fL      MCH 30.8 pg      MCHC 32.4 g/dL      RDW 13.4 %      RDW-SD 47.8 fl      MPV 11.2 fL      Platelets 240 10*3/mm3      Neutrophil % 86.1 %      Lymphocyte % 6.6 %      Monocyte % 6.1 %      Eosinophil % 0.7 %      Basophil % 0.3 %      Immature Grans % 0.2 %      Neutrophils, Absolute 13.61 10*3/mm3      Lymphocytes, Absolute 1.05 10*3/mm3      Monocytes, Absolute 0.96 10*3/mm3      Eosinophils, Absolute 0.11 10*3/mm3      Basophils, Absolute 0.04 10*3/mm3      Immature Grans, Absolute 0.03 10*3/mm3           Imaging Results (Most Recent)     Procedure Component Value Units Date/Time    CT Abdomen Pelvis With Contrast [[id number]] Collected: 04/16/22 1710     Updated: 04/16/22 1712    Narrative:      CT ABDOMEN AND PELVIS WITH CONTRAST, 4/16/2022    HISTORY:  41-year-old female in the ED with 3 day history right side abdomen pain. Elevated white blood cell count (15.8).    TECHNIQUE:  CT imaging of the abdomen and pelvis with IV contrast. Radiation dose reduction techniques included automated exposure control. Radiation audit for CT and nuclear cardiology exams in the last 12 months: 4.    RENAL/URINARY FINDINGS:  The right kidney is enlarged, there are patchy regions of wedge-shaped decreased parenchymal enhancement within the lower pole and mid kidney compatible with pyelonephritis. There is mild perinephric soft tissue stranding.    Left kidney is normal. There is no evidence of urinary  obstruction on the right or left. Urinary bladder is nondistended but does show mild bladder wall thickening which may indicate additional cystitis.    ABDOMEN FINDINGS:  No gallbladder distention or bile duct dilatation. Liver, pancreas and spleen are normal in size and appearance. Moderate volume stool throughout the colon. Small bowel and colon are otherwise unremarkable. The appendix is not directly visualized, but  there is no indirect CT evidence of acute appendicitis.    PELVIS FINDINGS:  Hysterectomy. Adnexal regions and rectum are unremarkable. Tiny amount of free pelvic fluid. No inguinal hernia.    Lung base images show no active disease.      Impression:      1.  Right renal pyelonephritis and likely cystitis. No evidence of abscess or additional complicating feature. No evidence of urinary obstruction.  2.  Hysterectomy.    Signer Name: James Max MD   Signed: 4/16/2022 5:10 PM   Workstation Name: [email or web address]    [facility]        reviewed personally with some stranding around the right renal area    ECG/EMG Results (most recent)     None              Assessment/Plan     Active Hospital Problems:  Active Hospital Problems    Diagnosis  POA   • Pyelonephritis of right kidney [N12]  Unknown      Resolved Hospital Problems   No resolved problems to display.     Right sided pyelonephritis  -CT abdomen reviewed  -UCX 4/16/2022: Pending  -Prior culture reviewed E. coli resistant to cephalosporins and fluoroquinolones  -IVF, pain control  -Continue Zosyn for now    Anxiety/depression  -Continue hydroxyzine    DVT ppx-SCDs    This patient has been examined wearing appropriate Personal Protective Equipment. 04/16/22      I discussed the patient's findings and my recommendations with patient.     Electronically signed by Mauro Correa DO, 04/16/22, 17:29 EDT.

## 2022-04-16 NOTE — PLAN OF CARE
Problem: Adult Inpatient Plan of Care  Goal: Patient-Specific Goal (Individualized)  Flowsheets (Taken 4/16/2022 1833)  Individualized Care Needs: goes by skylar or carlos   Goal Outcome Evaluation:

## 2022-04-17 LAB
ALBUMIN SERPL-MCNC: 2.8 G/DL (ref 3.5–5.2)
ALBUMIN/GLOB SERPL: 1.3 G/DL
ALP SERPL-CCNC: 65 U/L (ref 39–117)
ALT SERPL W P-5'-P-CCNC: 15 U/L (ref 1–33)
ANION GAP SERPL CALCULATED.3IONS-SCNC: 11.8 MMOL/L (ref 5–15)
AST SERPL-CCNC: 15 U/L (ref 1–32)
BASOPHILS # BLD AUTO: 0.05 10*3/MM3 (ref 0–0.2)
BASOPHILS NFR BLD AUTO: 0.2 % (ref 0–1.5)
BILIRUB SERPL-MCNC: 0.5 MG/DL (ref 0–1.2)
BUN SERPL-MCNC: 7 MG/DL (ref 6–20)
BUN/CREAT SERPL: 7.6 (ref 7–25)
CALCIUM SPEC-SCNC: 7.4 MG/DL (ref 8.6–10.5)
CHLORIDE SERPL-SCNC: 107 MMOL/L (ref 98–107)
CO2 SERPL-SCNC: 20.2 MMOL/L (ref 22–29)
CREAT SERPL-MCNC: 0.92 MG/DL (ref 0.57–1)
DEPRECATED RDW RBC AUTO: 47.5 FL (ref 37–54)
EGFRCR SERPLBLD CKD-EPI 2021: 81.4 ML/MIN/1.73
EOSINOPHIL # BLD AUTO: 0.06 10*3/MM3 (ref 0–0.4)
EOSINOPHIL NFR BLD AUTO: 0.3 % (ref 0.3–6.2)
ERYTHROCYTE [DISTWIDTH] IN BLOOD BY AUTOMATED COUNT: 13.8 % (ref 12.3–15.4)
GLOBULIN UR ELPH-MCNC: 2.2 GM/DL
GLUCOSE SERPL-MCNC: 117 MG/DL (ref 65–99)
HCT VFR BLD AUTO: 32.4 % (ref 34–46.6)
HGB BLD-MCNC: 10.5 G/DL (ref 12–15.9)
IMM GRANULOCYTES # BLD AUTO: 0.51 10*3/MM3 (ref 0–0.05)
IMM GRANULOCYTES NFR BLD AUTO: 2.3 % (ref 0–0.5)
LYMPHOCYTES # BLD AUTO: 1.07 10*3/MM3 (ref 0.7–3.1)
LYMPHOCYTES NFR BLD AUTO: 4.8 % (ref 19.6–45.3)
MCH RBC QN AUTO: 30.8 PG (ref 26.6–33)
MCHC RBC AUTO-ENTMCNC: 32.4 G/DL (ref 31.5–35.7)
MCV RBC AUTO: 95 FL (ref 79–97)
MONOCYTES # BLD AUTO: 1 10*3/MM3 (ref 0.1–0.9)
MONOCYTES NFR BLD AUTO: 4.5 % (ref 5–12)
NEUTROPHILS NFR BLD AUTO: 19.64 10*3/MM3 (ref 1.7–7)
NEUTROPHILS NFR BLD AUTO: 87.9 % (ref 42.7–76)
NRBC BLD AUTO-RTO: 0 /100 WBC (ref 0–0.2)
PLATELET # BLD AUTO: 199 10*3/MM3 (ref 140–450)
PMV BLD AUTO: 11.4 FL (ref 6–12)
POTASSIUM SERPL-SCNC: 4.1 MMOL/L (ref 3.5–5.2)
PROCALCITONIN SERPL-MCNC: 1.11 NG/ML (ref 0–0.25)
PROT SERPL-MCNC: 5 G/DL (ref 6–8.5)
RBC # BLD AUTO: 3.41 10*6/MM3 (ref 3.77–5.28)
RBC MORPH BLD: NORMAL
SMALL PLATELETS BLD QL SMEAR: ADEQUATE
SODIUM SERPL-SCNC: 139 MMOL/L (ref 136–145)
WBC MORPH BLD: NORMAL
WBC NRBC COR # BLD: 22.33 10*3/MM3 (ref 3.4–10.8)

## 2022-04-17 PROCEDURE — 99225 PR SBSQ OBSERVATION CARE/DAY 25 MINUTES: CPT | Performed by: INTERNAL MEDICINE

## 2022-04-17 PROCEDURE — 85025 COMPLETE CBC W/AUTO DIFF WBC: CPT | Performed by: INTERNAL MEDICINE

## 2022-04-17 PROCEDURE — 96366 THER/PROPH/DIAG IV INF ADDON: CPT

## 2022-04-17 PROCEDURE — 96376 TX/PRO/DX INJ SAME DRUG ADON: CPT

## 2022-04-17 PROCEDURE — 25010000002 PIPERACILLIN SOD-TAZOBACTAM PER 1 G: Performed by: INTERNAL MEDICINE

## 2022-04-17 PROCEDURE — 85007 BL SMEAR W/DIFF WBC COUNT: CPT | Performed by: INTERNAL MEDICINE

## 2022-04-17 PROCEDURE — 84145 PROCALCITONIN (PCT): CPT | Performed by: INTERNAL MEDICINE

## 2022-04-17 PROCEDURE — 25010000002 ONDANSETRON PER 1 MG: Performed by: INTERNAL MEDICINE

## 2022-04-17 PROCEDURE — 25010000002 KETOROLAC TROMETHAMINE PER 15 MG: Performed by: INTERNAL MEDICINE

## 2022-04-17 PROCEDURE — 94799 UNLISTED PULMONARY SVC/PX: CPT

## 2022-04-17 PROCEDURE — 96361 HYDRATE IV INFUSION ADD-ON: CPT

## 2022-04-17 PROCEDURE — 25010000002 PIPERACILLIN SOD-TAZOBACTAM PER 1 G

## 2022-04-17 PROCEDURE — G0378 HOSPITAL OBSERVATION PER HR: HCPCS

## 2022-04-17 PROCEDURE — 80053 COMPREHEN METABOLIC PANEL: CPT | Performed by: INTERNAL MEDICINE

## 2022-04-17 RX ORDER — SODIUM CHLORIDE 9 MG/ML
100 INJECTION, SOLUTION INTRAVENOUS CONTINUOUS
Status: DISCONTINUED | OUTPATIENT
Start: 2022-04-17 | End: 2022-04-19 | Stop reason: HOSPADM

## 2022-04-17 RX ORDER — SODIUM CHLORIDE 9 MG/ML
INJECTION, SOLUTION INTRAVENOUS
Status: DISPENSED
Start: 2022-04-17 | End: 2022-04-17

## 2022-04-17 RX ORDER — PIPERACILLIN SODIUM, TAZOBACTAM SODIUM 3; .375 G/15ML; G/15ML
INJECTION, POWDER, LYOPHILIZED, FOR SOLUTION INTRAVENOUS
Status: COMPLETED
Start: 2022-04-17 | End: 2022-04-17

## 2022-04-17 RX ORDER — SODIUM CHLORIDE 9 MG/ML
100 INJECTION, SOLUTION INTRAVENOUS CONTINUOUS
Status: DISCONTINUED | OUTPATIENT
Start: 2022-04-17 | End: 2022-04-17

## 2022-04-17 RX ORDER — DULOXETIN HYDROCHLORIDE 60 MG/1
60 CAPSULE, DELAYED RELEASE ORAL DAILY
Status: DISCONTINUED | OUTPATIENT
Start: 2022-04-17 | End: 2022-04-19 | Stop reason: HOSPADM

## 2022-04-17 RX ORDER — BUPROPION HYDROCHLORIDE 150 MG/1
150 TABLET ORAL DAILY
Status: DISCONTINUED | OUTPATIENT
Start: 2022-04-17 | End: 2022-04-19 | Stop reason: HOSPADM

## 2022-04-17 RX ORDER — SODIUM CHLORIDE 9 MG/ML
INJECTION, SOLUTION INTRAVENOUS
Status: COMPLETED
Start: 2022-04-17 | End: 2022-04-17

## 2022-04-17 RX ADMIN — KETOROLAC TROMETHAMINE 30 MG: 30 INJECTION, SOLUTION INTRAMUSCULAR; INTRAVENOUS at 08:21

## 2022-04-17 RX ADMIN — ACETAMINOPHEN 650 MG: 325 TABLET ORAL at 12:30

## 2022-04-17 RX ADMIN — KETOROLAC TROMETHAMINE 30 MG: 30 INJECTION, SOLUTION INTRAMUSCULAR; INTRAVENOUS at 17:21

## 2022-04-17 RX ADMIN — PANTOPRAZOLE SODIUM 40 MG: 40 TABLET, DELAYED RELEASE ORAL at 07:55

## 2022-04-17 RX ADMIN — ACETAMINOPHEN 650 MG: 325 TABLET ORAL at 01:31

## 2022-04-17 RX ADMIN — SODIUM CHLORIDE 500 ML: 9 INJECTION, SOLUTION INTRAVENOUS at 14:01

## 2022-04-17 RX ADMIN — SODIUM CHLORIDE 3.38 G: 900 INJECTION, SOLUTION INTRAVENOUS at 17:07

## 2022-04-17 RX ADMIN — DULOXETINE 60 MG: 60 CAPSULE, DELAYED RELEASE ORAL at 14:02

## 2022-04-17 RX ADMIN — PIPERACILLIN AND TAZOBACTAM 3375 MG: 3; .375 INJECTION, POWDER, LYOPHILIZED, FOR SOLUTION INTRAVENOUS; PARENTERAL at 01:56

## 2022-04-17 RX ADMIN — SODIUM CHLORIDE 150 ML/HR: 9 INJECTION, SOLUTION INTRAVENOUS at 01:27

## 2022-04-17 RX ADMIN — Medication 10 ML: at 08:21

## 2022-04-17 RX ADMIN — SODIUM CHLORIDE 150 ML/HR: 9 INJECTION, SOLUTION INTRAVENOUS at 21:05

## 2022-04-17 RX ADMIN — ONDANSETRON 4 MG: 2 INJECTION INTRAMUSCULAR; INTRAVENOUS at 17:21

## 2022-04-17 RX ADMIN — SODIUM CHLORIDE 150 ML/HR: 9 INJECTION, SOLUTION INTRAVENOUS at 09:52

## 2022-04-17 RX ADMIN — SODIUM CHLORIDE 150 ML/HR: 9 INJECTION, SOLUTION INTRAVENOUS at 14:02

## 2022-04-17 RX ADMIN — SODIUM CHLORIDE 3.38 G: 900 INJECTION, SOLUTION INTRAVENOUS at 08:21

## 2022-04-17 RX ADMIN — SODIUM CHLORIDE 100 ML: 9 INJECTION, SOLUTION INTRAVENOUS at 01:54

## 2022-04-17 RX ADMIN — BUPROPION HYDROCHLORIDE 150 MG: 150 TABLET, EXTENDED RELEASE ORAL at 14:02

## 2022-04-17 NOTE — PLAN OF CARE
Goal Outcome Evaluation:    Alert and pleasant.  Able to make needs and wants known to staff.  Chronic muscle spasms w/prn for pain effective.  Up to restroom per self.  Educated patient not to transfer immediately following pain medication or while asymptomatic hypotension occurring.  Voiced understanding.  Continues on IV ABT, afebrile.  Patient uses call light appropriately, and within reach.  Will continue to monitor.

## 2022-04-17 NOTE — PROGRESS NOTES
"Hospital Medicine Team    LOS 0 days      Patient Care Team:  Wilton Brice MD as PCP - General  Wilton Brice MD as PCP - Family Medicine          Chief Complaint:  Flank pain    Subjective      Continues to feel poorly with right sided flank pain modestly improved. Nauseated this am.  Review of Systems   Constitutional: Negative for fever.   Gastrointestinal: Negative for vomiting.       Objective    Vital Signs  Temp:  [97.1 °F (36.2 °C)-100.7 °F (38.2 °C)] 98.2 °F (36.8 °C)  Heart Rate:  [] 88  Resp:  [16-18] 18  BP: ()/(46-61) 80/46  Oxygen Therapy  SpO2: 99 %  Pulse Oximetry Type: Intermittent  Device (Oxygen Therapy): room air  Flowsheet Rows    Flowsheet Row First Filed Value   Admission Height 154.9 cm (61\") Documented at 04/16/2022 1532   Admission Weight 57.2 kg (126 lb) Documented at 04/16/2022 1532              Physical Exam:  Physical Exam  Constitutional:       Appearance: She is ill-appearing.   HENT:      Head: Normocephalic.   Eyes:      Pupils: Pupils are equal, round, and reactive to light.   Cardiovascular:      Rate and Rhythm: Normal rate.   Pulmonary:      Effort: No respiratory distress.   Abdominal:      Tenderness: There is right CVA tenderness.   Musculoskeletal:      Right lower leg: No edema.      Left lower leg: No edema.   Skin:     General: Skin is warm.   Neurological:      Mental Status: She is alert and oriented to person, place, and time.   Psychiatric:         Mood and Affect: Affect is flat.           Results Review:    I reviewed the patient's new clinical results.    Results from last 7 days   Lab Units 04/17/22  0546 04/16/22  1544   WBC 10*3/mm3 22.33* 15.80*   HEMOGLOBIN g/dL 10.5* 12.3   HEMATOCRIT % 32.4* 38.0   PLATELETS 10*3/mm3 199 240     Results from last 7 days   Lab Units 04/17/22  0546 04/16/22  1544   SODIUM mmol/L 139 137   POTASSIUM mmol/L 4.1 4.6   CHLORIDE mmol/L 107 103   CO2 mmol/L 20.2* 23.7   BUN mg/dL 7 7   CREATININE mg/dL 0.92 0.92 "   CALCIUM mg/dL 7.4* 8.6   BILIRUBIN mg/dL 0.5 0.4   ALK PHOS U/L 65 74   ALT (SGPT) U/L 15 14   AST (SGOT) U/L 15 16   GLUCOSE mg/dL 117* 95           Microbiology Results (last 10 days)     Procedure Component Value - Date/Time    COVID-19,[facility] IN-HOUSE,Nasal Swab No Transport Media 3-4 HR TAT - Swab, Nasal Cavity [[id number]]  (Normal) Collected: 04/16/22 1743    Lab Status: Final result Specimen: Swab from Nasal Cavity Updated: 04/16/22 1912     COVID19 Not Detected    Narrative:      Fact sheet for providers: https://www.fda.gov/media/612575/download     Fact sheet for patients: https://www.fda.gov/media/446836/download    Test performed by PCR.    Consider negative results in combination with clinical observations, patient history, and epidemiological information.                  CT Abdomen Pelvis With Contrast    Result Date: 4/16/2022  1.  Right renal pyelonephritis and likely cystitis. No evidence of abscess or additional complicating feature. No evidence of urinary obstruction. 2.  Hysterectomy. Signer Name: James Max MD  Signed: 4/16/2022 5:10 PM  Workstation [facility]: [facility]  [facility]      ECG/EMG Results (most recent)     None            X-rays, labs reviewed personally by physician.    Medication Review:   I have reviewed the patient's current medication list    Scheduled Meds  pantoprazole, 40 mg, Oral, QAM  piperacillin-tazobactam, 3.375 g, Intravenous, Q8H  sodium chloride, 10 mL, Intravenous, Q12H  sodium chloride, , ,         Meds Infusions  sodium chloride, 100 mL/hr        Meds PRN  •  acetaminophen **OR** acetaminophen **OR** acetaminophen  •  aluminum-magnesium hydroxide-simethicone  •  hydrOXYzine  •  ketorolac  •  melatonin  •  ondansetron **OR** ondansetron  •  [COMPLETED] Insert peripheral IV **AND** sodium chloride  •  sodium chloride  •  sodium chloride            Assessment/Plan  (MDM)    Active Hospital Problems:  Active Hospital Problems     Diagnosis  POA   • **Pyelonephritis of right kidney [N12]  Unknown      Resolved Hospital Problems   No resolved problems to display.     Right sided pyelonephritis  -Slow to improve  -UCX 4/16/2022: GN bacilli  -Prior culture reviewed E. coli resistant to cephalosporins and fluoroquinolones  -pain control  -BP has been soft, continue aggressive IVF  -will continue Zosyn for now     Anxiety/depression  -Continue hydroxyzine, duloxetine, Cymbalta    DVT ppx-SCD    This patient has been examined wearing appropriate Personal Protective Equipment. 04/17/22      Plan for disposition:once more improved and when UCx results     Electronically signed by Mauro Correa DO, 04/17/22, 13:24 EDT.

## 2022-04-17 NOTE — CASE MANAGEMENT/SOCIAL WORK
Discharge Planning Assessment   Ankita Mary     Patient Name: Kristin Taylor  MRN: [id number]  Today's Date: 4/17/2022    Admit Date: 4/16/2022     Discharge Needs Assessment     Row Name 04/17/22 1209       Living Environment    People in Home other relative(s)    Current Living Arrangements home    Primary Care Provided by self    Provides Primary Care For no one    Family Caregiver if Needed parent(s)    Quality of Family Relationships involved;supportive    Able to Return to Prior Arrangements yes       Resource/Environmental Concerns    Resource/Environmental Concerns none    Transportation Concerns none       Transition Planning    Patient/Family Anticipates Transition to home with family    Patient/Family Anticipated Services at Transition none       Discharge Needs Assessment    Readmission Within the Last 30 Days no previous admission in last 30 days    Equipment Currently Used at Home none    Concerns to be Addressed no discharge needs identified    Anticipated Changes Related to Illness none    Equipment Needed After Discharge none    Provided Post Acute Provider List? N/A    Provided Post Acute Provider Quality & Resource List? N/A               Discharge Plan     Row Name 04/17/22 1210       Plan    Plan plan home w family    Patient/Family in Agreement with Plan yes    Plan Comments Spoke with patient at bedside. Face sheet verified. Patient lives in a home with her mother, sister, her sisters children ages 9 & 12 yrs old, and her grandmother. She is independent of of ADLs including driving. She denies use of DME/HH/Rehab. She does not have a living will and is not interested in creating one. She uses [facility] and denies issues obtaining medictions.She does not anticipate any needs at dc. CM # placed on white board, will follow for dc needs.              Continued Care and Services - Admitted Since 4/16/2022    Coordination has not been started for this encounter.       Expected Discharge Date and  Time     Expected Discharge Date Expected Discharge Time    Apr 18, 2022          Demographic Summary     Row Name 04/17/22 1208       General Information    Admission Type observation    Arrived From home    Referral Source admission list    Reason for Consult discharge planning    Preferred Language English       Contact Information    Permission Granted to Share Info With                Functional Status    No documentation.                Psychosocial    No documentation.                Abuse/Neglect    No documentation.                Legal    No documentation.                Substance Abuse    No documentation.                Patient Forms    No documentation.                   Octavio Hopkins RN

## 2022-04-17 NOTE — PLAN OF CARE
Goal Outcome Evaluation:  Plan of Care Reviewed With: patient           Outcome Evaluation: Pt reports mild pain this shift controlled by PRN medication. BP remains low. Pt asymptomatic. 500 cc bolus given per order. IV fluids and antibiotics continued. Pt urinating without difficulty. Pt using call light appropriately. Pt resting in bed at this time.

## 2022-04-18 ENCOUNTER — APPOINTMENT (OUTPATIENT)
Dept: GENERAL RADIOLOGY | Facility: HOSPITAL | Age: 42
End: 2022-04-18

## 2022-04-18 LAB
ANION GAP SERPL CALCULATED.3IONS-SCNC: 9.9 MMOL/L (ref 5–15)
BACTERIA SPEC AEROBE CULT: ABNORMAL
BUN SERPL-MCNC: 8 MG/DL (ref 6–20)
BUN/CREAT SERPL: 10.4 (ref 7–25)
CALCIUM SPEC-SCNC: 7.5 MG/DL (ref 8.6–10.5)
CHLORIDE SERPL-SCNC: 108 MMOL/L (ref 98–107)
CO2 SERPL-SCNC: 20.1 MMOL/L (ref 22–29)
CREAT SERPL-MCNC: 0.77 MG/DL (ref 0.57–1)
DEPRECATED RDW RBC AUTO: 48.3 FL (ref 37–54)
EGFRCR SERPLBLD CKD-EPI 2021: 100.8 ML/MIN/1.73
ERYTHROCYTE [DISTWIDTH] IN BLOOD BY AUTOMATED COUNT: 14 % (ref 12.3–15.4)
GLUCOSE SERPL-MCNC: 113 MG/DL (ref 65–99)
HCT VFR BLD AUTO: 31.2 % (ref 34–46.6)
HGB BLD-MCNC: 10.2 G/DL (ref 12–15.9)
MCH RBC QN AUTO: 30.8 PG (ref 26.6–33)
MCHC RBC AUTO-ENTMCNC: 32.7 G/DL (ref 31.5–35.7)
MCV RBC AUTO: 94.3 FL (ref 79–97)
PLATELET # BLD AUTO: 192 10*3/MM3 (ref 140–450)
PMV BLD AUTO: 11.4 FL (ref 6–12)
POTASSIUM SERPL-SCNC: 3.9 MMOL/L (ref 3.5–5.2)
PROCALCITONIN SERPL-MCNC: 0.86 NG/ML (ref 0–0.25)
RBC # BLD AUTO: 3.31 10*6/MM3 (ref 3.77–5.28)
SODIUM SERPL-SCNC: 138 MMOL/L (ref 136–145)
WBC NRBC COR # BLD: 22.5 10*3/MM3 (ref 3.4–10.8)

## 2022-04-18 PROCEDURE — 25010000002 KETOROLAC TROMETHAMINE PER 15 MG: Performed by: INTERNAL MEDICINE

## 2022-04-18 PROCEDURE — 74018 RADEX ABDOMEN 1 VIEW: CPT

## 2022-04-18 PROCEDURE — 25010000002 KETOROLAC TROMETHAMINE PER 15 MG: Performed by: HOSPITALIST

## 2022-04-18 PROCEDURE — 25010000002 PIPERACILLIN SOD-TAZOBACTAM PER 1 G: Performed by: INTERNAL MEDICINE

## 2022-04-18 PROCEDURE — 80048 BASIC METABOLIC PNL TOTAL CA: CPT | Performed by: INTERNAL MEDICINE

## 2022-04-18 PROCEDURE — G0378 HOSPITAL OBSERVATION PER HR: HCPCS

## 2022-04-18 PROCEDURE — 25010000002 ONDANSETRON PER 1 MG: Performed by: INTERNAL MEDICINE

## 2022-04-18 PROCEDURE — 96376 TX/PRO/DX INJ SAME DRUG ADON: CPT

## 2022-04-18 PROCEDURE — 99225 PR SBSQ OBSERVATION CARE/DAY 25 MINUTES: CPT | Performed by: NURSE PRACTITIONER

## 2022-04-18 PROCEDURE — 71045 X-RAY EXAM CHEST 1 VIEW: CPT

## 2022-04-18 PROCEDURE — 96361 HYDRATE IV INFUSION ADD-ON: CPT

## 2022-04-18 PROCEDURE — 96366 THER/PROPH/DIAG IV INF ADDON: CPT

## 2022-04-18 PROCEDURE — 85027 COMPLETE CBC AUTOMATED: CPT | Performed by: INTERNAL MEDICINE

## 2022-04-18 PROCEDURE — 25010000002 CEFTRIAXONE SODIUM-DEXTROSE 2-2.22 GM-%(50ML) RECONSTITUTED SOLUTION: Performed by: NURSE PRACTITIONER

## 2022-04-18 PROCEDURE — 94799 UNLISTED PULMONARY SVC/PX: CPT

## 2022-04-18 PROCEDURE — 84145 PROCALCITONIN (PCT): CPT | Performed by: INTERNAL MEDICINE

## 2022-04-18 RX ORDER — KETOROLAC TROMETHAMINE 30 MG/ML
30 INJECTION, SOLUTION INTRAMUSCULAR; INTRAVENOUS EVERY 6 HOURS PRN
Status: DISCONTINUED | OUTPATIENT
Start: 2022-04-18 | End: 2022-04-19 | Stop reason: HOSPADM

## 2022-04-18 RX ORDER — CEFTRIAXONE 2 G/50ML
2 INJECTION, SOLUTION INTRAVENOUS EVERY 24 HOURS
Status: DISCONTINUED | OUTPATIENT
Start: 2022-04-18 | End: 2022-04-19 | Stop reason: HOSPADM

## 2022-04-18 RX ORDER — LIDOCAINE 50 MG/G
1 PATCH TOPICAL
Status: DISCONTINUED | OUTPATIENT
Start: 2022-04-18 | End: 2022-04-19 | Stop reason: HOSPADM

## 2022-04-18 RX ADMIN — KETOROLAC TROMETHAMINE 30 MG: 30 INJECTION, SOLUTION INTRAMUSCULAR; INTRAVENOUS at 18:22

## 2022-04-18 RX ADMIN — ACETAMINOPHEN 650 MG: 325 TABLET ORAL at 21:44

## 2022-04-18 RX ADMIN — SODIUM CHLORIDE 3.38 G: 900 INJECTION, SOLUTION INTRAVENOUS at 01:10

## 2022-04-18 RX ADMIN — CEFTRIAXONE 2 G: 2 INJECTION, SOLUTION INTRAVENOUS at 11:39

## 2022-04-18 RX ADMIN — Medication 10 ML: at 09:39

## 2022-04-18 RX ADMIN — SODIUM CHLORIDE 150 ML/HR: 9 INJECTION, SOLUTION INTRAVENOUS at 09:39

## 2022-04-18 RX ADMIN — KETOROLAC TROMETHAMINE 30 MG: 30 INJECTION, SOLUTION INTRAMUSCULAR; INTRAVENOUS at 13:04

## 2022-04-18 RX ADMIN — SODIUM CHLORIDE 3.38 G: 900 INJECTION, SOLUTION INTRAVENOUS at 09:33

## 2022-04-18 RX ADMIN — KETOROLAC TROMETHAMINE 30 MG: 30 INJECTION, SOLUTION INTRAMUSCULAR; INTRAVENOUS at 06:44

## 2022-04-18 RX ADMIN — LIDOCAINE 1 PATCH: 50 PATCH CUTANEOUS at 01:52

## 2022-04-18 RX ADMIN — ONDANSETRON 4 MG: 2 INJECTION INTRAMUSCULAR; INTRAVENOUS at 06:45

## 2022-04-18 RX ADMIN — DULOXETINE 60 MG: 60 CAPSULE, DELAYED RELEASE ORAL at 09:32

## 2022-04-18 RX ADMIN — SODIUM CHLORIDE 100 ML/HR: 9 INJECTION, SOLUTION INTRAVENOUS at 18:22

## 2022-04-18 RX ADMIN — BUPROPION HYDROCHLORIDE 150 MG: 150 TABLET, EXTENDED RELEASE ORAL at 09:32

## 2022-04-18 RX ADMIN — Medication 10 ML: at 20:46

## 2022-04-18 RX ADMIN — KETOROLAC TROMETHAMINE 30 MG: 30 INJECTION, SOLUTION INTRAMUSCULAR; INTRAVENOUS at 01:07

## 2022-04-18 RX ADMIN — PANTOPRAZOLE SODIUM 40 MG: 40 TABLET, DELAYED RELEASE ORAL at 06:40

## 2022-04-18 NOTE — PLAN OF CARE
Goal Outcome Evaluation:    Alert and pleasant, able to make needs and wants known to staff.  Chronic muscle spasms w/prn for pain effective.  New order for [place] patch to lower back.  Will await effectiveness.  Up to restroom per self w/adequate clear yellow urine out.  Continues; on ABT therapy for UTI.  Afebrile.   At times, patient w/asymprtomatic hypotension.  Patient uses call light appropriately, and within reach.  Will continue to monitor for safety and comfort throughout HS.

## 2022-04-18 NOTE — PROGRESS NOTES
"SERVICE: [facility] HOSPITALIST    CONSULTANTS:    CHIEF COMPLAINT: Follow-up acute right pyelonephritis    SUBJECTIVE: The patient reports that she feels slightly better this morning, less nauseated but has not eaten yet.  She notes history of similar episode several years ago where she was hospitalized with pyelonephritis however no further UTIs since then until recently.  She notes her pain is controlled.  No other new concerns overnight.    OBJECTIVE:    BP 96/66 (BP Location: Left arm, Patient Position: Lying)   Pulse 77   Temp 98.1 °F (36.7 °C) (Oral)   Resp 16   Ht 154.9 cm (61\")   Wt 52 kg (114 lb 9.6 oz)   SpO2 94%   BMI 21.65 kg/m²     MEDS/LABS REVIEWED AND ORDERED    buPROPion XL, 150 mg, Oral, Daily  cefTRIAXone, 2 g, Intravenous, Q24H  DULoxetine, 60 mg, Oral, Daily  lidocaine, 1 patch, Transdermal, Q24H  pantoprazole, 40 mg, Oral, QAM  sodium chloride, 10 mL, Intravenous, Q12H      Physical Exam  Vitals reviewed.   Constitutional:       General: She is not in acute distress.     Comments: Appears older than stated age, mildly ill appearance   HENT:      Head: Normocephalic and atraumatic.      Mouth/Throat:      Mouth: Mucous membranes are moist.   Eyes:      Extraocular Movements: Extraocular movements intact.      Pupils: Pupils are equal, round, and reactive to light.   Cardiovascular:      Rate and Rhythm: Normal rate and regular rhythm.   Pulmonary:      Effort: Pulmonary effort is normal. No respiratory distress.      Breath sounds: Normal breath sounds. No wheezing or rales.   Abdominal:      General: Abdomen is flat. Bowel sounds are normal. There is no distension.      Palpations: Abdomen is soft.      Tenderness: There is abdominal tenderness. There is right CVA tenderness. There is no guarding.      Comments: Very tender right upper quadrant/right flank with right CVA tenderness   Musculoskeletal:         General: No swelling.   Skin:     General: Skin is warm and " "dry.      Capillary Refill: Capillary refill takes less than 2 seconds.      Findings: No erythema.   Neurological:      General: No focal deficit present.      Mental Status: She is alert and oriented to person, place, and time.   Psychiatric:         Mood and Affect: Mood normal.         Behavior: Behavior normal.       LAB/DIAGNOSTICS:    Lab Results (last 24 hours)     Procedure Component Value Units Date/Time    Urine Culture - Urine, Urine, Clean Catch [[id number]]  (Abnormal)  (Susceptibility) Collected: 04/16/22 1544    Specimen: Urine, Clean Catch Updated: 04/18/22 1010     Urine Culture >100,000 CFU/mL Escherichia coli    Susceptibility      Escherichia coli      BUTCH      Ampicillin Resistant     Ampicillin + Sulbactam Susceptible      Cefazolin Susceptible      Cefepime Susceptible      Ceftazidime Susceptible      Ceftriaxone Susceptible      Gentamicin Susceptible      Levofloxacin Susceptible      Nitrofurantoin Susceptible      Piperacillin + Tazobactam Susceptible      Trimethoprim + Sulfamethoxazole Susceptible                    Linear View                   Procalcitonin [[id number]]  (Abnormal) Collected: 04/18/22 0421    Specimen: Blood Updated: 04/18/22 0504     Procalcitonin 0.86 ng/mL     Narrative:      As a Marker for Sepsis (Non-Neonates):    1. <0.5 ng/mL represents a low risk of severe sepsis and/or septic shock.  2. >2 ng/mL represents a high risk of severe sepsis and/or septic shock.    As a Marker for Lower Respiratory Tract Infections that require antibiotic therapy:    PCT on Admission    Antibiotic Therapy       6-12 Hrs later    >0.5                Strongly Recommended  >0.25 - <0.5        Recommended   0.1 - 0.25          Discouraged              Remeasure/reassess PCT  <0.1                Strongly Discouraged     Remeasure/reassess PCT    As 28 day mortality risk marker: \"Change in Procalcitonin Result\" (>80% or <=80%) if Day 0 (or Day 1) and Day 4 values are available. Refer to " http://www.[facility]-pct-calculator.com    Change in PCT <=80%  A decrease of PCT levels below or equal to 80% defines a positive change in PCT test result representing a higher risk for 28-day all-cause mortality of patients diagnosed with severe sepsis for septic shock.    Change in PCT >80%  A decrease of PCT levels of more than 80% defines a negative change in PCT result representing a lower risk for 28-day all-cause mortality of patients diagnosed with severe sepsis or septic shock.       Basic Metabolic Panel [[id number]]  (Abnormal) Collected: 04/18/22 0421    Specimen: Blood Updated: 04/18/22 0459     Glucose 113 mg/dL      BUN 8 mg/dL      Creatinine 0.77 mg/dL      Sodium 138 mmol/L      Potassium 3.9 mmol/L      Chloride 108 mmol/L      CO2 20.1 mmol/L      Calcium 7.5 mg/dL      BUN/Creatinine Ratio 10.4     Anion Gap 9.9 mmol/L      eGFR 100.8 mL/min/1.73      Comment: National Kidney Foundation and American Society of Nephrology (ASN) Task Force recommended calculation based on the Chronic Kidney Disease Epidemiology Collaboration (CKD-EPI) equation refit without adjustment for race.       Narrative:      GFR Normal >60  Chronic Kidney Disease <60  Kidney Failure <15      CBC (No Diff) [[id number]]  (Abnormal) Collected: 04/18/22 0421    Specimen: Blood Updated: 04/18/22 0429     WBC 22.50 10*3/mm3      RBC 3.31 10*6/mm3      Hemoglobin 10.2 g/dL      Hematocrit 31.2 %      MCV 94.3 fL      MCH 30.8 pg      MCHC 32.7 g/dL      RDW 14.0 %      RDW-SD 48.3 fl      MPV 11.4 fL      Platelets 192 10*3/mm3     Blood Culture - Blood, Arm, Left [[id number]]  (Normal) Collected: 04/16/22 1719    Specimen: Blood from Arm, Left Updated: 04/17/22 1733     Blood Culture No growth at 24 hours    Blood Culture - Blood, Arm, Right [[id number]]  (Normal) Collected: 04/16/22 1723    Specimen: Blood from Arm, Right Updated: 04/17/22 1733     Blood Culture No growth at 24 hours        No orders to display       CT Abdomen  "Pelvis With Contrast    Result Date: 4/16/2022  1.  Right renal pyelonephritis and likely cystitis. No evidence of abscess or additional complicating feature. No evidence of urinary obstruction. 2.  Hysterectomy. Signer Name: James Max MD  Signed: 4/16/2022 5:10 PM  Workstation Name: [email or web address]-  [facility]    ASSESSMENT/PLAN:  Acute right E. 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 While the intention is to ensure transparency and to engage patients in their healthcare, this immediate access may create unintended consequences because this document uses language intended for communication between medical providers for interpretation with the entirety of the patient's clinical picture in mind. It is recommended that patients and/or their designees review all available information with their primary or specialist providers for explanation and to avoid misinterpretation of this information.    \"Dictated utilizing Dragon dictation\"      "

## 2022-04-19 VITALS
WEIGHT: 114.6 LBS | HEART RATE: 73 BPM | RESPIRATION RATE: 17 BRPM | TEMPERATURE: 98 F | HEIGHT: 61 IN | SYSTOLIC BLOOD PRESSURE: 112 MMHG | OXYGEN SATURATION: 95 % | BODY MASS INDEX: 21.64 KG/M2 | DIASTOLIC BLOOD PRESSURE: 73 MMHG

## 2022-04-19 LAB
BASOPHILS # BLD AUTO: 0.03 10*3/MM3 (ref 0–0.2)
BASOPHILS NFR BLD AUTO: 0.2 % (ref 0–1.5)
DEPRECATED RDW RBC AUTO: 48.1 FL (ref 37–54)
EOSINOPHIL # BLD AUTO: 0.15 10*3/MM3 (ref 0–0.4)
EOSINOPHIL NFR BLD AUTO: 1 % (ref 0.3–6.2)
ERYTHROCYTE [DISTWIDTH] IN BLOOD BY AUTOMATED COUNT: 13.9 % (ref 12.3–15.4)
HCT VFR BLD AUTO: 32.3 % (ref 34–46.6)
HGB BLD-MCNC: 10.6 G/DL (ref 12–15.9)
IMM GRANULOCYTES # BLD AUTO: 0.09 10*3/MM3 (ref 0–0.05)
IMM GRANULOCYTES NFR BLD AUTO: 0.6 % (ref 0–0.5)
LYMPHOCYTES # BLD AUTO: 1.54 10*3/MM3 (ref 0.7–3.1)
LYMPHOCYTES NFR BLD AUTO: 10.6 % (ref 19.6–45.3)
MCH RBC QN AUTO: 30.9 PG (ref 26.6–33)
MCHC RBC AUTO-ENTMCNC: 32.8 G/DL (ref 31.5–35.7)
MCV RBC AUTO: 94.2 FL (ref 79–97)
MONOCYTES # BLD AUTO: 1.05 10*3/MM3 (ref 0.1–0.9)
MONOCYTES NFR BLD AUTO: 7.2 % (ref 5–12)
NEUTROPHILS NFR BLD AUTO: 11.66 10*3/MM3 (ref 1.7–7)
NEUTROPHILS NFR BLD AUTO: 80.4 % (ref 42.7–76)
NRBC BLD AUTO-RTO: 0 /100 WBC (ref 0–0.2)
PLATELET # BLD AUTO: 243 10*3/MM3 (ref 140–450)
PMV BLD AUTO: 11.2 FL (ref 6–12)
PROCALCITONIN SERPL-MCNC: 0.34 NG/ML (ref 0–0.25)
RBC # BLD AUTO: 3.43 10*6/MM3 (ref 3.77–5.28)
WBC NRBC COR # BLD: 14.52 10*3/MM3 (ref 3.4–10.8)

## 2022-04-19 PROCEDURE — 84145 PROCALCITONIN (PCT): CPT | Performed by: NURSE PRACTITIONER

## 2022-04-19 PROCEDURE — 25010000002 KETOROLAC TROMETHAMINE PER 15 MG: Performed by: HOSPITALIST

## 2022-04-19 PROCEDURE — 96376 TX/PRO/DX INJ SAME DRUG ADON: CPT

## 2022-04-19 PROCEDURE — 85025 COMPLETE CBC W/AUTO DIFF WBC: CPT | Performed by: NURSE PRACTITIONER

## 2022-04-19 PROCEDURE — 96361 HYDRATE IV INFUSION ADD-ON: CPT

## 2022-04-19 PROCEDURE — 25010000002 CEFTRIAXONE SODIUM-DEXTROSE 2-2.22 GM-%(50ML) RECONSTITUTED SOLUTION: Performed by: NURSE PRACTITIONER

## 2022-04-19 PROCEDURE — G0378 HOSPITAL OBSERVATION PER HR: HCPCS

## 2022-04-19 RX ORDER — ACETAMINOPHEN 325 MG/1
650 TABLET ORAL EVERY 4 HOURS PRN
Start: 2022-04-19 | End: 2023-02-14

## 2022-04-19 RX ORDER — CEFDINIR 300 MG/1
300 CAPSULE ORAL 2 TIMES DAILY
Qty: 24 CAPSULE | Refills: 0 | Status: SHIPPED | OUTPATIENT
Start: 2022-04-19 | End: 2022-05-01

## 2022-04-19 RX ADMIN — Medication 10 ML: at 08:50

## 2022-04-19 RX ADMIN — PANTOPRAZOLE SODIUM 40 MG: 40 TABLET, DELAYED RELEASE ORAL at 06:16

## 2022-04-19 RX ADMIN — ACETAMINOPHEN 650 MG: 325 TABLET ORAL at 06:16

## 2022-04-19 RX ADMIN — SODIUM CHLORIDE 100 ML/HR: 9 INJECTION, SOLUTION INTRAVENOUS at 06:17

## 2022-04-19 RX ADMIN — KETOROLAC TROMETHAMINE 30 MG: 30 INJECTION, SOLUTION INTRAMUSCULAR; INTRAVENOUS at 13:39

## 2022-04-19 RX ADMIN — LIDOCAINE 1 PATCH: 50 PATCH CUTANEOUS at 08:46

## 2022-04-19 RX ADMIN — DULOXETINE 60 MG: 60 CAPSULE, DELAYED RELEASE ORAL at 08:46

## 2022-04-19 RX ADMIN — CEFTRIAXONE 2 G: 2 INJECTION, SOLUTION INTRAVENOUS at 12:51

## 2022-04-19 RX ADMIN — BUPROPION HYDROCHLORIDE 150 MG: 150 TABLET, EXTENDED RELEASE ORAL at 08:46

## 2022-04-19 RX ADMIN — KETOROLAC TROMETHAMINE 30 MG: 30 INJECTION, SOLUTION INTRAMUSCULAR; INTRAVENOUS at 00:26

## 2022-04-19 NOTE — PLAN OF CARE
Goal Outcome Evaluation:  Plan of Care Reviewed With: patient        Progress: no change  Outcome Evaluation: c/o abd pain right sided kub and cxr completed, md aware of results, pain has improved throughout the night, labs this am

## 2022-04-19 NOTE — DISCHARGE SUMMARY
"Kristin Taylor  1980  [id number]    Hospitalists Discharge Summary    Date of Admission: 4/16/2022  Date of Discharge:  4/19/2022    History of Present Illness from \[facility] [place] [facility]\"" on admit:   \"41-year-old female started having some right-sided flank pain starting on Thursday.  Symptoms have progressed to somewhat severe sharp stabbing 7-8 out of 10 right flank and suprapubic pain that have been associated with fevers at home.  She seemed to be getting progressively weak.  She been having some nausea but had no vomiting.  Since issues were worsening came to the ER noted to have fever while there.  Given IV fluids lab work obtained had abnormal CT of her abdomen and thus admission was requested for further care.  Chart was reviewed had a similar presentation for pyelonephritis in 2018.\"    Primary Discharge diagnoses:  Acute right E. coli pyelonephritis    Secondary Discharge Diagnoses:   Normocytic anemia  Possible RLL infiltrate-asymptomatic  Anxiety/depression GERD  Chronic back pain  History of HSV    Hospital Course Summary:   The patient improved daily with IV hydration and rocephin. Today, she is feeling well, eating and drinking without nausea and pain is controlled. WBCC trending down. She will d/c on omnicef x 12 more days. She is aware of need to f/u with Wilton Brice MD for repeat UA.   F/U Wilton Brice MD 1 week    PCP  Patient Care Team:  Wilton Brice MD as PCP - General  Wilton Brice MD as PCP - Family Medicine    Consults:   Consults     No orders found from 3/18/2022 to 4/17/2022.        Operations and Procedures Performed:     CT Abdomen Pelvis With Contrast    Result Date: 4/16/2022  Narrative: CT ABDOMEN AND PELVIS WITH CONTRAST, 4/16/2022 HISTORY: 41-year-old female in the ED with 3 day history right side abdomen pain. Elevated white blood cell count (15.8). TECHNIQUE: CT imaging of the abdomen and pelvis with IV contrast. Radiation dose reduction techniques included automated exposure " control. Radiation audit for CT and nuclear cardiology exams in the last 12 months: 4. RENAL/URINARY FINDINGS: The right kidney is enlarged, there are patchy regions of wedge-shaped decreased parenchymal enhancement within the lower pole and mid kidney compatible with pyelonephritis. There is mild perinephric soft tissue stranding. Left kidney is normal. There is no evidence of urinary obstruction on the right or left. Urinary bladder is nondistended but does show mild bladder wall thickening which may indicate additional cystitis. ABDOMEN FINDINGS: No gallbladder distention or bile duct dilatation. Liver, pancreas and spleen are normal in size and appearance. Moderate volume stool throughout the colon. Small bowel and colon are otherwise unremarkable. The appendix is not directly visualized, but there is no indirect CT evidence of acute appendicitis. PELVIS FINDINGS: Hysterectomy. Adnexal regions and rectum are unremarkable. Tiny amount of free pelvic fluid. No inguinal hernia. Lung base images show no active disease.     Impression: 1.  Right renal pyelonephritis and likely cystitis. No evidence of abscess or additional complicating feature. No evidence of urinary obstruction. 2.  Hysterectomy. Signer Name: James Max MD  Signed: 4/16/2022 5:10 PM  Workstation Name: [email or web address]  [facility]    XR Chest 1 View    Result Date: 4/18/2022  Narrative: CR Chest 1 Vw INDICATION: Abdominal pain with shortness of air. COMPARISON:  CT scan of pelvis 4/16/2022 FINDINGS: Portable AP view(s) of the chest. No visible support lines. Extensive patchy infiltrate right lower lobe with diffuse prominence of the interstitium and perihilar regions. No effusions. Heart size within normal limits. Mediastinum unremarkable. Postoperative changes lower C-spine.     Impression: Extensive air space opacity right lower lobe highly suggestive of acute infectious pneumonia. Perihilar prominence and diffuse  interstitial prominence may represent diffuse interstitial edema and/or infiltrates. Signer Name: PAULETTE Jauregui MD  Signed: 4/18/2022 9:25 PM  Workstation Name: [facility]  [facility]    XR Abdomen KUB    Result Date: 4/18/2022  Narrative: CR Abdomen 1 Vw INDICATION: Right sided abdominal pain today COMPARISON: None available FINDINGS: AP radiograph(s) of the abdomen. The renal shadows are symmetric. No renal calculi. No bladder calculi. The bowel gas pattern is nonobstructive. No acute osseous abnormalities. No radiopaque foreign body.     Impression: Negative KUB. Signer Name: Cody Nieto MD  Signed: 4/18/2022 9:22 PM  Workstation Name: [email or web address]  [facility]    Allergies:  is allergic to hydrocodone-acetaminophen and tramadol.    [place]  No medications noted per report, reviewed by me    Discharge Medications:     Discharge Medications      New Medications      Instructions Start Date   acetaminophen 325 MG tablet  Commonly known as: TYLENOL   650 mg, Oral, Every 4 Hours PRN      cefdinir 300 MG capsule  Commonly known as: OMNICEF   300 mg, Oral, 2 Times Daily         Continue These Medications      Instructions Start Date   buPROPion  MG 24 hr tablet  Commonly known as: WELLBUTRIN XL   150 mg, Oral, Daily      DULoxetine 60 MG capsule  Commonly known as: CYMBALTA   60 mg, Oral, Daily      hydrOXYzine 25 MG tablet  Commonly known as: ATARAX   25 mg, Oral, Every 8 Hours PRN      omeprazole 40 MG capsule  Commonly known as: priLOSEC   40 mg, Oral, Daily             Last Lab Results:   Lab Results (most recent)     Procedure Component Value Units Date/Time    Blood Culture - Blood, Arm, Right [[id number]]  (Normal) Collected: 04/16/22 1723    Specimen: Blood from Arm, Right Updated: 04/18/22 1733     Blood Culture No growth at 2 days    Blood Culture - Blood, Arm, Left [[id number]]  (Normal) Collected: 04/16/22 1719    Specimen: Blood from Arm, Left  "Updated: 04/18/22 1732     Blood Culture No growth at 2 days    Urine Culture - Urine, Urine, Clean Catch [[id number]]  (Abnormal)  (Susceptibility) Collected: 04/16/22 1544    Specimen: Urine, Clean Catch Updated: 04/18/22 1010     Urine Culture >100,000 CFU/mL Escherichia coli    Susceptibility      Escherichia coli      BUTCH      Ampicillin Resistant     Ampicillin + Sulbactam Susceptible      Cefazolin Susceptible      Cefepime Susceptible      Ceftazidime Susceptible      Ceftriaxone Susceptible      Gentamicin Susceptible      Levofloxacin Susceptible      Nitrofurantoin Susceptible      Piperacillin + Tazobactam Susceptible      Trimethoprim + Sulfamethoxazole Susceptible                    Linear View                   Procalcitonin [[id number]]  (Abnormal) Collected: 04/18/22 0421    Specimen: Blood Updated: 04/18/22 0504     Procalcitonin 0.86 ng/mL     Narrative:      As a Marker for Sepsis (Non-Neonates):    1. <0.5 ng/mL represents a low risk of severe sepsis and/or septic shock.  2. >2 ng/mL represents a high risk of severe sepsis and/or septic shock.    As a Marker for Lower Respiratory Tract Infections that require antibiotic therapy:    PCT on Admission    Antibiotic Therapy       6-12 Hrs later    >0.5                Strongly Recommended  >0.25 - <0.5        Recommended   0.1 - 0.25          Discouraged              Remeasure/reassess PCT  <0.1                Strongly Discouraged     Remeasure/reassess PCT    As 28 day mortality risk marker: \"Change in Procalcitonin Result\" (>80% or <=80%) if Day 0 (or Day 1) and Day 4 values are available. Refer to http://www.[facility]-pct-calculator.com    Change in PCT <=80%  A decrease of PCT levels below or equal to 80% defines a positive change in PCT test result representing a higher risk for 28-day all-cause mortality of patients diagnosed with severe sepsis for septic shock.    Change in PCT >80%  A decrease of PCT levels of more than 80% defines a negative " change in PCT result representing a lower risk for 28-day all-cause mortality of patients diagnosed with severe sepsis or septic shock.       Basic Metabolic Panel [[id number]]  (Abnormal) Collected: 04/18/22 0421    Specimen: Blood Updated: 04/18/22 0459     Glucose 113 mg/dL      BUN 8 mg/dL      Creatinine 0.77 mg/dL      Sodium 138 mmol/L      Potassium 3.9 mmol/L      Chloride 108 mmol/L      CO2 20.1 mmol/L      Calcium 7.5 mg/dL      BUN/Creatinine Ratio 10.4     Anion Gap 9.9 mmol/L      eGFR 100.8 mL/min/1.73      Comment: National Kidney Foundation and American Society of Nephrology (ASN) Task Force recommended calculation based on the Chronic Kidney Disease Epidemiology Collaboration (CKD-EPI) equation refit without adjustment for race.       Narrative:      GFR Normal >60  Chronic Kidney Disease <60  Kidney Failure <15      CBC (No Diff) [[id number]]  (Abnormal) Collected: 04/18/22 0421    Specimen: Blood Updated: 04/18/22 0429     WBC 22.50 10*3/mm3      RBC 3.31 10*6/mm3      Hemoglobin 10.2 g/dL      Hematocrit 31.2 %      MCV 94.3 fL      MCH 30.8 pg      MCHC 32.7 g/dL      RDW 14.0 %      RDW-SD 48.3 fl      MPV 11.4 fL      Platelets 192 10*3/mm3     Scan Slide [[id number]] Collected: 04/17/22 0546    Specimen: Blood Updated: 04/17/22 0712     RBC Morphology Normal     WBC Morphology Normal     Platelet Estimate Adequate    Comprehensive Metabolic Panel [[id number]]  (Abnormal) Collected: 04/17/22 0546    Specimen: Blood Updated: 04/17/22 0651     Glucose 117 mg/dL      BUN 7 mg/dL      Creatinine 0.92 mg/dL      Sodium 139 mmol/L      Potassium 4.1 mmol/L      Chloride 107 mmol/L      CO2 20.2 mmol/L      Calcium 7.4 mg/dL      Total Protein 5.0 g/dL      Albumin 2.80 g/dL      ALT (SGPT) 15 U/L      AST (SGOT) 15 U/L      Alkaline Phosphatase 65 U/L      Total Bilirubin 0.5 mg/dL      Globulin 2.2 gm/dL      A/G Ratio 1.3 g/dL      BUN/Creatinine Ratio 7.6     Anion Gap 11.8 mmol/L      eGFR 81.4  "mL/min/1.73      Comment: National Kidney Foundation and American Society of Nephrology (ASN) Task Force recommended calculation based on the Chronic Kidney Disease Epidemiology Collaboration (CKD-EPI) equation refit without adjustment for race.       Narrative:      GFR Normal >60  Chronic Kidney Disease <60  Kidney Failure <15      Procalcitonin [[id number]]  (Abnormal) Collected: 04/17/22 0546    Specimen: Blood Updated: 04/17/22 0650     Procalcitonin 1.11 ng/mL     Narrative:      As a Marker for Sepsis (Non-Neonates):    1. <0.5 ng/mL represents a low risk of severe sepsis and/or septic shock.  2. >2 ng/mL represents a high risk of severe sepsis and/or septic shock.    As a Marker for Lower Respiratory Tract Infections that require antibiotic therapy:    PCT on Admission    Antibiotic Therapy       6-12 Hrs later    >0.5                Strongly Recommended  >0.25 - <0.5        Recommended   0.1 - 0.25          Discouraged              Remeasure/reassess PCT  <0.1                Strongly Discouraged     Remeasure/reassess PCT    As 28 day mortality risk marker: \"Change in Procalcitonin Result\" (>80% or <=80%) if Day 0 (or Day 1) and Day 4 values are available. Refer to http://www.[employer]-pct-calculator.com    Change in PCT <=80%  A decrease of PCT levels below or equal to 80% defines a positive change in PCT test result representing a higher risk for 28-day all-cause mortality of patients diagnosed with severe sepsis for septic shock.    Change in PCT >80%  A decrease of PCT levels of more than 80% defines a negative change in PCT result representing a lower risk for 28-day all-cause mortality of patients diagnosed with severe sepsis or septic shock.       CBC Auto Differential [[id number]]  (Abnormal) Collected: 04/17/22 0546    Specimen: Blood Updated: 04/17/22 0629     WBC 22.33 10*3/mm3      RBC 3.41 10*6/mm3      Hemoglobin 10.5 g/dL      Hematocrit 32.4 %      MCV 95.0 fL      MCH 30.8 pg      MCHC 32.4 g/dL "      RDW 13.8 %      RDW-SD 47.5 fl      MPV 11.4 fL      Platelets 199 10*3/mm3      Neutrophil % 87.9 %      Lymphocyte % 4.8 %      Monocyte % 4.5 %      Eosinophil % 0.3 %      Basophil % 0.2 %      Immature Grans % 2.3 %      Neutrophils, Absolute 19.64 10*3/mm3      Lymphocytes, Absolute 1.07 10*3/mm3      Monocytes, Absolute 1.00 10*3/mm3      Eosinophils, Absolute 0.06 10*3/mm3      Basophils, Absolute 0.05 10*3/mm3      Immature Grans, Absolute 0.51 10*3/mm3      nRBC 0.0 /100 WBC     COVID-19,[employer] IN-HOUSE,Nasal Swab No Transport Media 3-4 HR TAT - Swab, Nasal Cavity [[id number]]  (Normal) Collected: 04/16/22 1743    Specimen: Swab from Nasal Cavity Updated: 04/16/22 1912     COVID19 Not Detected    Narrative:      Fact sheet for providers: https://www.fda.gov/media/032966/download     Fact sheet for patients: https://www.fda.gov/media/678944/download    Test performed by PCR.    Consider negative results in combination with clinical observations, patient history, and epidemiological information.    Urine Drug Screen - Urine, Clean Catch [[id number]]  (Normal) Collected: 04/16/22 1544    Specimen: Urine, Clean Catch Updated: 04/16/22 1904     THC, Screen, Urine Negative     Phencyclidine (PCP), Urine Negative     Cocaine Screen, Urine Negative     Methamphetamine, Ur Negative     Opiate Screen Negative     Amphetamine Screen, Urine Negative     Benzodiazepine Screen, Urine Negative     Tricyclic Antidepressants Screen Negative     Methadone Screen, Urine Negative     Barbiturates Screen, Urine Negative     Oxycodone Screen, Urine Negative     Propoxyphene Screen Negative     Buprenorphine, Screen, Urine Negative    Narrative:      Urine drug screen results are to be used for medical purposes only.  They are not to be used for legal purposes such as employment testing.  Negative results do not necessarily mean the complete absence of a subtance, but rather that the result is less than the cutoff for that  substance.  Positive results are unconfirmed and considered Preliminary Positive.  [facility] does not automatically confirm Postitive Unconfirmed results.  The physician may request (order) an Unconfirmed Positive result to be sent out for confirmation.      Negative Thresholds for Drugs Screened:    THC screen, urine                          50 ng/ml  Phenycyclidine (PCP), urine                25 ng/ml  Cocaine screen, urine                     150 ng/ml  Methamphetamine, urine                    500 ng/ml  Opiate screen, urine                      100 ng/ml  Amphetamine screen, urine                 500 ng/ml  Benzodiazepine screen, urine              150 ng/ml  Tricyclic Antidepressants screen, urine   300 ng/ml  Methadone screen, urine                   200 ng/ml  Barbiturates screen, urine                200 ng/ml  Oxycodone screen, urine                   100 ng/ml  Propoxyphene screen, urine                300 ng/ml  Buprenorphine screen, urine                10 ng/ml    Lactic Acid, Plasma [[id number]]  (Normal) Collected: 04/16/22 1724    Specimen: Blood Updated: 04/16/22 1749     Lactate 1.0 mmol/L     Urinalysis, Microscopic Only - Urine, Clean Catch [[id number]]  (Abnormal) Collected: 04/16/22 1544    Specimen: Urine, Clean Catch Updated: 04/16/22 1729     RBC, UA 21-30 /HPF      WBC, UA 13-20 /HPF      Bacteria, UA 1+ /HPF      Squamous Epithelial Cells, UA 0-2 /HPF      Hyaline Casts, UA None Seen /LPF      Methodology Manual Light Microscopy    Comprehensive Metabolic Panel [[id number]] Collected: 04/16/22 1544    Specimen: Blood Updated: 04/16/22 1617     Glucose 95 mg/dL      BUN 7 mg/dL      Creatinine 0.92 mg/dL      Sodium 137 mmol/L      Potassium 4.6 mmol/L      Chloride 103 mmol/L      CO2 23.7 mmol/L      Calcium 8.6 mg/dL      Total Protein 6.3 g/dL      Albumin 3.80 g/dL      ALT (SGPT) 14 U/L      AST (SGOT) 16 U/L      Alkaline Phosphatase 74 U/L      Total Bilirubin 0.4  mg/dL      Globulin 2.5 gm/dL      A/G Ratio 1.5 g/dL      BUN/Creatinine Ratio 7.6     Anion Gap 10.3 mmol/L      eGFR 81.4 mL/min/1.73      Comment: National Kidney Foundation and American Society of Nephrology (ASN) Task Force recommended calculation based on the Chronic Kidney Disease Epidemiology Collaboration (CKD-EPI) equation refit without adjustment for race.       Narrative:      GFR Normal >60  Chronic Kidney Disease <60  Kidney Failure <15      Urinalysis With Culture If Indicated - Urine, Clean Catch [[id number]]  (Abnormal) Collected: 04/16/22 1544    Specimen: Urine, Clean Catch Updated: 04/16/22 1612     Color, UA Yellow     Appearance, UA Cloudy     pH, UA 7.0     Specific Gravity, UA 1.020     Glucose, UA Negative     Ketones, UA Negative     Bilirubin, UA Negative     Blood, UA Moderate (2+)     Protein,  mg/dL (2+)     Leuk Esterase, UA Moderate (2+)     Nitrite, UA Positive     Urobilinogen, UA 0.2 E.U./dL    Pregnancy, Urine - Urine, Clean Catch [[id number]]  (Normal) Collected: 04/16/22 1544    Specimen: Urine, Clean Catch Updated: 04/16/22 1608     HCG, Urine QL Negative    Protime-INR [[id number]]  (Normal) Collected: 04/16/22 1544    Specimen: Blood Updated: 04/16/22 1608     Protime 12.9 Seconds      INR 0.95    Narrative:      Therapeutic Ranges for INR: 2.0-3.0 (PT 20-30)                              2.5-3.5 (PT 25-34)    aPTT [[id number]]  (Normal) Collected: 04/16/22 1544    Specimen: Blood Updated: 04/16/22 1608     PTT 31.2 seconds     Narrative:      PTT = The equivalent PTT values for the therapeutic range of heparin levels at 0.1 to 0.7 U/ml are 53 to 110 seconds.      CBC & Differential [[id number]]  (Abnormal) Collected: 04/16/22 1544    Specimen: Blood Updated: 04/16/22 1603    Narrative:      The following orders were created for panel order CBC & Differential.  Procedure                               Abnormality         Status                     ---------                                -----------         ------                     CBC Auto Differential[[id number]]        Abnormal            Final result                 Please view results for these tests on the individual orders.    CBC Auto Differential [[id number]]  (Abnormal) Collected: 04/16/22 1544    Specimen: Blood Updated: 04/16/22 1603     WBC 15.80 10*3/mm3      RBC 3.99 10*6/mm3      Hemoglobin 12.3 g/dL      Hematocrit 38.0 %      MCV 95.2 fL      MCH 30.8 pg      MCHC 32.4 g/dL      RDW 13.4 %      RDW-SD 47.8 fl      MPV 11.2 fL      Platelets 240 10*3/mm3      Neutrophil % 86.1 %      Lymphocyte % 6.6 %      Monocyte % 6.1 %      Eosinophil % 0.7 %      Basophil % 0.3 %      Immature Grans % 0.2 %      Neutrophils, Absolute 13.61 10*3/mm3      Lymphocytes, Absolute 1.05 10*3/mm3      Monocytes, Absolute 0.96 10*3/mm3      Eosinophils, Absolute 0.11 10*3/mm3      Basophils, Absolute 0.04 10*3/mm3      Immature Grans, Absolute 0.03 10*3/mm3         Imaging Results (Most Recent)     Procedure Component Value Units Date/Time    XR Chest 1 View [[id number]] Collected: 04/18/22 2125     Updated: 04/18/22 2127    Narrative:      CR Chest 1 Vw    INDICATION:   Abdominal pain with shortness of air.     COMPARISON:    CT scan of pelvis 4/16/2022    FINDINGS:  Portable AP view(s) of the chest.    No visible support lines.    Extensive patchy infiltrate right lower lobe with diffuse prominence of the interstitium and perihilar regions. No effusions. Heart size within normal limits. Mediastinum unremarkable. Postoperative changes lower C-spine.       Impression:      Extensive air space opacity right lower lobe highly suggestive of acute infectious pneumonia.    Perihilar prominence and diffuse interstitial prominence may represent diffuse interstitial edema and/or infiltrates.    Signer Name: PAULETTE Jauregui MD   Signed: 4/18/2022 9:25 PM   Workstation Name: [facility]    [facility]    XR Abdomen KUB  [[id number]] Collected: 04/18/22 2122     Updated: 04/18/22 2124    Narrative:      CR Abdomen 1 Vw    INDICATION:   Right sided abdominal pain today    COMPARISON:   None available    FINDINGS:  AP radiograph(s) of the abdomen. The renal shadows are symmetric. No renal calculi. No bladder calculi.    The bowel gas pattern is nonobstructive. No acute osseous abnormalities. No radiopaque foreign body.      Impression:      Negative KUB.    Signer Name: Cody Nieto MD   Signed: 4/18/2022 9:22 PM   Workstation Name: [email or web address]    [facility]    CT Abdomen Pelvis With Contrast [[id number]] Collected: 04/16/22 1710     Updated: 04/16/22 1712    Narrative:      CT ABDOMEN AND PELVIS WITH CONTRAST, 4/16/2022    HISTORY:  41-year-old female in the ED with 3 day history right side abdomen pain. Elevated white blood cell count (15.8).    TECHNIQUE:  CT imaging of the abdomen and pelvis with IV contrast. Radiation dose reduction techniques included automated exposure control. Radiation audit for CT and nuclear cardiology exams in the last 12 months: 4.    RENAL/URINARY FINDINGS:  The right kidney is enlarged, there are patchy regions of wedge-shaped decreased parenchymal enhancement within the lower pole and mid kidney compatible with pyelonephritis. There is mild perinephric soft tissue stranding.    Left kidney is normal. There is no evidence of urinary obstruction on the right or left. Urinary bladder is nondistended but does show mild bladder wall thickening which may indicate additional cystitis.    ABDOMEN FINDINGS:  No gallbladder distention or bile duct dilatation. Liver, pancreas and spleen are normal in size and appearance. Moderate volume stool throughout the colon. Small bowel and colon are otherwise unremarkable. The appendix is not directly visualized, but  there is no indirect CT evidence of acute appendicitis.    PELVIS FINDINGS:  Hysterectomy. Adnexal regions and rectum are  unremarkable. Tiny amount of free pelvic fluid. No inguinal hernia.    Lung base images show no active disease.      Impression:      1.  Right renal pyelonephritis and likely cystitis. No evidence of abscess or additional complicating feature. No evidence of urinary obstruction.  2.  Hysterectomy.    Signer Name: James Max MD   Signed: 4/16/2022 5:10 PM   Workstation Name: [facility]    [facility] [place]          PROCEDURES: none    Condition on Discharge:  stable    Physical Exam at Discharge  Vital Signs  Temp:  [97.8 °F (36.6 °C)-98.3 °F (36.8 °C)] 98 °F (36.7 °C)  Heart Rate:  [72-92] 73  Resp:  [14-17] 17  BP: ()/(64-75) 112/73   Body mass index is 21.65 kg/m².      Physical Exam  Vitals reviewed.   Constitutional:       General: She is not in acute distress.     Appearance: Normal appearance. She is not ill-appearing.   HENT:      Head: Normocephalic and atraumatic.      Mouth/Throat:      Mouth: Mucous membranes are moist.   Eyes:      Extraocular Movements: Extraocular movements intact.      Pupils: Pupils are equal, round, and reactive to light.   Cardiovascular:      Rate and Rhythm: Normal rate and regular rhythm.   Pulmonary:      Effort: Pulmonary effort is normal. No respiratory distress.      Breath sounds: Normal breath sounds. No wheezing or rales.   Abdominal:      General: Abdomen is flat. Bowel sounds are normal. There is no distension.      Palpations: Abdomen is soft.      Tenderness: There is no abdominal tenderness. There is no guarding.   Musculoskeletal:         General: No swelling.   Skin:     General: Skin is warm and dry.      Capillary Refill: Capillary refill takes less than 2 seconds.      Findings: No erythema.   Neurological:      General: No focal deficit present.      Mental Status: She is alert and oriented to person, place, and time.   Psychiatric:         Mood and Affect: Mood normal.         Behavior: Behavior normal.       Discharge  Disposition  Home    Visiting Nurse:    No     Home PT/OT:  No     Home Safety Evaluation:  No     DME  None     Discharge Diet:      Dietary Orders (From admission, onward)     Start     Ordered    04/16/22 1811  Diet Regular  Diet Effective Now        Question:  Diet Texture / Consistency  Answer:  Regular    04/16/22 1811                Activity at Discharge:  As tolerated    Pre-discharge education  Medications, follow up    Follow-up Appointments  Future Appointments   Date Time Provider Department Center   4/26/2022 10:30 AM [facility] MRI 1 [facility]     Additional Instructions for the Follow-ups that You Need to Schedule     Discharge Follow-up with PCP   As directed       Currently Documented PCP:    Wilton Brice MD    PCP Phone Number:    [phone]     Follow Up Details: 1 week               Test Results Pending at Discharge: to be f/u by Wilton Brice MD   Pending Labs     Order Current Status    Blood Culture - Blood, Arm, Left Preliminary result    Blood Culture - Blood, Arm, Right Preliminary result           Shelley Nelson, CHRISTOS  04/19/22  11:50 EDT    Time: Discharge 30 min (if over 30 minutes give explanation as to why it took greater than 30 minutes)    As of April 2021, as required by the Federal 21st Century Cures Act, medical records (including provider notes and laboratory/imaging results) are to be made available to patients and/or their designees as soon as the documents are signed/resulted. While the intention is to ensure transparency and to engage patients in their healthcare, this immediate access may create unintended consequences because this document uses language intended for communication between medical providers for interpretation with the entirety of the patient's clinical picture in mind. It is recommended that patients and/or their designees review all available information with their primary or specialist providers for explanation and to avoid misinterpretation of  "this information.     \"Dictated utilizing Dragon dictation\"      "

## 2022-04-19 NOTE — CASE MANAGEMENT/SOCIAL WORK
Continued Stay Note  CODY Powers     Patient Name: Kristin Taylor  MRN: [id number]  Today's Date: 4/19/2022    Admit Date: 4/16/2022     Discharge Plan     Row Name 04/19/22 1259       Plan    Plan Plan is to dc Home with family    Plan Comments CCP spoke to pt in her room.  Indroduced self and reviewed dc plans.  Pt says she is hoping to go home today.  No changes to dc plan.  She has family to assist if needd on dc. She has a ride home.  Pt plan is ot go home.  No other needs identified.  CCP placed phone number on board in room.  CCP will continue to follow as needed.  ThanksYu               Discharge Codes    No documentation.               Expected Discharge Date and Time     Expected Discharge Date Expected Discharge Time    Apr 18, 2022             Yu Bunn

## 2022-04-20 ENCOUNTER — READMISSION MANAGEMENT (OUTPATIENT)
Dept: CALL CENTER | Facility: HOSPITAL | Age: 42
End: 2022-04-20

## 2022-04-20 NOTE — OUTREACH NOTE
Prep Survey    Flowsheet Row Responses   [facility] patient discharged from? [facility]   Is LACE score < 7 ? Yes   Emergency Room discharge w/ pulse ox? No   Eligibility Readm Mgmt   Discharge diagnosis Acute right E. coli pyelonephritis   Does the patient have one of the following disease processes/diagnoses(primary or secondary)? Other   Does the patient have Home health ordered? No   Is there a DME ordered? No   Prep survey completed? Yes          VARINDER JOHNSON - Registered Nurse

## 2022-04-20 NOTE — CASE MANAGEMENT/SOCIAL WORK
Case Management Discharge Note      Final Note: Discharged home.    Provided Post Acute Provider List?: N/A  Provided Post Acute Provider Quality & Resource List?: N/A    Selected Continued Care - Discharged on 4/19/2022 Admission date: 4/16/2022 - Discharge disposition: Home or Self Care    Destination    No services have been selected for the patient.              Durable Medical Equipment    No services have been selected for the patient.              Dialysis/Infusion    No services have been selected for the patient.              Home Medical Care    No services have been selected for the patient.              Therapy    No services have been selected for the patient.              Community Resources    No services have been selected for the patient.              Community & DME    No services have been selected for the patient.                       Final Discharge Disposition Code: 01 - home or self-care

## 2022-04-21 ENCOUNTER — READMISSION MANAGEMENT (OUTPATIENT)
Dept: CALL CENTER | Facility: HOSPITAL | Age: 42
End: 2022-04-21

## 2022-04-21 LAB
BACTERIA SPEC AEROBE CULT: NORMAL
BACTERIA SPEC AEROBE CULT: NORMAL

## 2022-04-21 NOTE — OUTREACH NOTE
LAG < 7 Survey    Flowsheet Row Responses   [facility] patient discharged from? [facility]   Does the patient have one of the following disease processes/diagnoses(primary or secondary)? Other   BHLAG <7 Attempt successful? Yes   Call start time 0814   Call end time 0819   Discharge diagnosis Acute right E. coli pyelonephritis   Meds reviewed with patient/caregiver? Yes   Is the patient having any side effects they believe may be caused by any medication additions or changes? No   Does the patient have all medications ordered at discharge? Yes   Is the patient taking all medications as directed (includes completed medication regime)? Yes   Comments regarding appointments She will make a call to follow up with PCP today.    Does the patient have a primary care provider?  Yes   Does the patient have an appointment with their PCP within 7 days of discharge? No   What is preventing the patient from scheduling follow up appointments within 7 days of discharge? Haven't had time   Nursing Interventions Educated patient on importance of making appointment, Advised patient to make appointment   Has the patient kept scheduled appointments due by today? N/A   Has home health visited the patient within 72 hours of discharge? N/A   Psychosocial issues? No   Did the patient receive a copy of their discharge instructions? Yes   Nursing interventions Reviewed instructions with patient   What is the patient's perception of their health status since discharge? Improving  [She reports she feel much better. No problems. Will follow up with PCP. ]   Is the patient/caregiver able to teach back signs and symptoms related to disease process for when to call PCP? No   Is the patient/caregiver able to teach back signs and symptoms related to disease process for when to call 911? Yes   Is the patient/caregiver able to teach back the hierarchy of who to call/visit for symptoms/problems? PCP, Specialist, Home health nurse, Urgent Care, ED,  911 Yes   If the patient is a current smoker, are they able to teach back resources for cessation? Not a smoker   Graduated Yes   Is the patient interested in additional calls from an ambulatory ?  NOTE:  applies to high risk patients requiring additional follow-up. Ramona LORD N - Registered Nurse

## 2022-04-26 ENCOUNTER — HOSPITAL ENCOUNTER (OUTPATIENT)
Dept: MRI IMAGING | Facility: HOSPITAL | Age: 42
Discharge: HOME | End: 2022-04-26
Admitting: PHYSICIAN ASSISTANT

## 2022-04-26 DIAGNOSIS — M54.50 LOW BACK PAIN, UNSPECIFIED BACK PAIN LATERALITY, UNSPECIFIED CHRONICITY, UNSPECIFIED WHETHER SCIATICA PRESENT: ICD-10-CM

## 2022-04-26 PROCEDURE — 72148 MRI LUMBAR SPINE W/O DYE: CPT

## 2022-05-16 ENCOUNTER — HOSPITAL ENCOUNTER (EMERGENCY)
Facility: HOSPITAL | Age: 42
Discharge: HOME | End: 2022-05-16
Attending: EMERGENCY MEDICINE | Admitting: EMERGENCY MEDICINE

## 2022-05-16 VITALS
WEIGHT: 129 LBS | BODY MASS INDEX: 24.35 KG/M2 | TEMPERATURE: 98.3 F | HEART RATE: 71 BPM | DIASTOLIC BLOOD PRESSURE: 79 MMHG | SYSTOLIC BLOOD PRESSURE: 121 MMHG | RESPIRATION RATE: 16 BRPM | HEIGHT: 61 IN | OXYGEN SATURATION: 100 %
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
## 2022-05-16 DIAGNOSIS — S40.262A TICK BITE OF LEFT SHOULDER, INITIAL ENCOUNTER: Primary | ICD-10-CM

## 2022-05-16 PROCEDURE — 99282 EMERGENCY DEPT VISIT SF MDM: CPT

## 2022-05-16 PROCEDURE — 99283 EMERGENCY DEPT VISIT LOW MDM: CPT

## 2022-05-16 RX ORDER — DOXYCYCLINE 100 MG/1
200 CAPSULE ORAL ONCE
Status: COMPLETED | OUTPATIENT
Start: 2022-05-16 | End: 2022-05-16

## 2022-05-16 RX ORDER — BACITRACIN, NEOMYCIN, POLYMYXIN B 400; 3.5; 5 [USP'U]/G; MG/G; [USP'U]/G
1 OINTMENT TOPICAL 2 TIMES DAILY
Qty: 14 G | Refills: 0 | Status: SHIPPED | OUTPATIENT
Start: 2022-05-16 | End: 2022-05-23

## 2022-05-16 RX ADMIN — DOXYCYCLINE 200 MG: 100 CAPSULE ORAL at 18:18
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
## 2022-05-16 NOTE — DISCHARGE INSTRUCTIONS
Medication as directed.  Wash the affected area 3 times daily with antibacterial soap, pat dry and apply bacitracin or Neosporin.  Follow-up with your PCP as above.  Return to ED for medical emergencies.

## 2023-01-09 ENCOUNTER — OFFICE VISIT (OUTPATIENT)
Dept: OBSTETRICS AND GYNECOLOGY | Facility: CLINIC | Age: 43
End: 2023-01-09
Payer: COMMERCIAL

## 2023-01-09 VITALS
WEIGHT: 132 LBS | HEIGHT: 61 IN | BODY MASS INDEX: 24.92 KG/M2 | DIASTOLIC BLOOD PRESSURE: 78 MMHG | SYSTOLIC BLOOD PRESSURE: 112 MMHG

## 2023-01-09 DIAGNOSIS — Z01.419 PAP SMEAR, LOW-RISK: ICD-10-CM

## 2023-01-09 DIAGNOSIS — Z11.51 SPECIAL SCREENING EXAMINATION FOR HUMAN PAPILLOMAVIRUS (HPV): ICD-10-CM

## 2023-01-09 DIAGNOSIS — Z12.31 ENCOUNTER FOR SCREENING MAMMOGRAM FOR MALIGNANT NEOPLASM OF BREAST: ICD-10-CM

## 2023-01-09 DIAGNOSIS — R23.2 HOT FLASHES: ICD-10-CM

## 2023-01-09 DIAGNOSIS — Z11.3 SCREEN FOR STD (SEXUALLY TRANSMITTED DISEASE): ICD-10-CM

## 2023-01-09 DIAGNOSIS — N39.0 URINARY TRACT INFECTION WITHOUT HEMATURIA, SITE UNSPECIFIED: ICD-10-CM

## 2023-01-09 DIAGNOSIS — Z01.419 ROUTINE GYNECOLOGICAL EXAMINATION: ICD-10-CM

## 2023-01-09 DIAGNOSIS — Z23 NEED FOR HPV VACCINATION: Primary | ICD-10-CM

## 2023-01-09 LAB
BILIRUB BLD-MCNC: NEGATIVE MG/DL
CLARITY, POC: CLEAR
COLOR UR: YELLOW
GLUCOSE UR STRIP-MCNC: NEGATIVE MG/DL
KETONES UR QL: NEGATIVE
LEUKOCYTE EST, POC: NEGATIVE
NITRITE UR-MCNC: POSITIVE MG/ML
PH UR: 5 [PH] (ref 5–8)
PROT UR STRIP-MCNC: NEGATIVE MG/DL
RBC # UR STRIP: NEGATIVE /UL
SP GR UR: 1 (ref 1–1.03)
UROBILINOGEN UR QL: NORMAL

## 2023-01-09 PROCEDURE — 90471 IMMUNIZATION ADMIN: CPT | Performed by: OBSTETRICS & GYNECOLOGY

## 2023-01-09 PROCEDURE — 2014F MENTAL STATUS ASSESS: CPT | Performed by: OBSTETRICS & GYNECOLOGY

## 2023-01-09 PROCEDURE — 3008F BODY MASS INDEX DOCD: CPT | Performed by: OBSTETRICS & GYNECOLOGY

## 2023-01-09 PROCEDURE — 90651 9VHPV VACCINE 2/3 DOSE IM: CPT | Performed by: OBSTETRICS & GYNECOLOGY

## 2023-01-09 PROCEDURE — 99204 OFFICE O/P NEW MOD 45 MIN: CPT | Performed by: OBSTETRICS & GYNECOLOGY

## 2023-01-09 RX ORDER — NITROFURANTOIN 25; 75 MG/1; MG/1
100 CAPSULE ORAL 2 TIMES DAILY
Qty: 14 CAPSULE | Refills: 0 | Status: SHIPPED | OUTPATIENT
Start: 2023-01-09 | End: 2023-01-16

## 2023-01-09 NOTE — PROGRESS NOTES
GYN Annual Exam     CC- Here for annual exam.     Kristin Taylor is a 42 y.o. female established patient not seen in the last 3 years who presents for annual well woman exam. She was last seen in 10/2019. Pt had TLH /BS with OC in 2015 for pain. She is nitrite positive today and has some dysuria. She is also interested in starting Gardasil. Her previous partner committed suicide last year. She was just  this past August. 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 She is having HF and night sweats. She lives in [place] now.     OB History        5    Para   3    Term                AB   2    Living   4       SAB   2    IAB        Ectopic        Molar        Multiple   1    Live Births              Obstetric Comments   4   1 SAB with D&C             Menarche: 14  Current contraception: status post hysterectomy- s/p TLH. BS with OC in 2014 for pain  History of abnormal Pap smear: yes - 1 abnormal with nl f/u  History of abnormal mammogram: no  Family history of uterine, colon or ovarian cancer: no  Family history of breast cancer: no  H/o STDs: HSV 2  Gardasil: none  Last pap: 10/2018- nl pap/HPV  H/o vaginismus- s/p PT    Health Maintenance   Topic Date Due   • Pneumococcal Vaccine 0-64 (2 - PCV) 2016   • ANNUAL PHYSICAL  Never done   • Annual Gynecologic Pelvic and Breast Exam  10/18/2020   • COVID-19 Vaccine (2 - Pfizer series) 2021   • INFLUENZA VACCINE  2022   • TDAP/TD VACCINES (4 - Td or Tdap) 2024   • PAP SMEAR  2026   • HEPATITIS C SCREENING  Completed       Past Medical History:   Diagnosis Date   • Abnormal Pap smear of cervix     1 abnl wiht nl f/u   • Anxiety    • Bacterial vaginosis    • Depression    • Herpes     + HSV serology, no outbreaks   • Urinary tract infection        Past Surgical History:   Procedure Laterality Date   • BACK SURGERY     • D & C WITH SUCTION      SAB x1   • HYSTERECTOMY  2015    TLH/BS with OC   •  KNEE ARTHROSCOPY     • SHOULDER LIGAMENT REPAIR     • TUBAL ABDOMINAL LIGATION  2012         Current Outpatient Medications:   •  acetaminophen (TYLENOL) 325 MG tablet, Take 2 tablets by mouth Every 4 (Four) Hours As Needed for Mild Pain ., Disp: , Rfl:   •  buPROPion XL (WELLBUTRIN XL) 150 MG 24 hr tablet, Take 150 mg by mouth Daily., Disp: , Rfl:   •  hydrOXYzine (ATARAX) 25 MG tablet, Take 1 tablet by mouth Every 8 (Eight) Hours As Needed for Anxiety., Disp: 21 tablet, Rfl: 0  •  omeprazole (PriLOSEC) 40 MG capsule, Take 1 capsule by mouth daily., Disp: 30 capsule, Rfl: 0  •  DULoxetine (CYMBALTA) 60 MG capsule, Take 60 mg by mouth Daily., Disp: , Rfl:     Allergies   Allergen Reactions   • Hydrocodone-Acetaminophen Nausea And Vomiting     PT ON NORCO 5 AND TOLERATES FINE, BUT REPORT VICODIN CAUSED N/V AFTER DENTAL EXTRACTION   • Tramadol Nausea And Vomiting       Social History     Tobacco Use   • Smoking status: Smoker, Current Status Unknown     Packs/day: 0.50     Years: 6.00     Pack years: 3.00     Types: Cigarettes   • Smokeless tobacco: Never   • Tobacco comments:     quit 2 years, but started back recently   Vaping Use   • Vaping Use: Former   Substance Use Topics   • Alcohol use: No   • Drug use: Yes     Types: Methamphetamines, Cocaine(coke)     Comment: used 2 years ago       Family History   Problem Relation Age of Onset   • Breast cancer Neg Hx    • Ovarian cancer Neg Hx    • Colon cancer Neg Hx    • Prostate cancer Neg Hx    • Deep vein thrombosis Neg Hx        Review of Systems   Constitutional: Positive for activity change. Negative for appetite change, fatigue, fever and unexpected weight change.   Respiratory: Negative for cough and shortness of breath.    Cardiovascular: Negative for chest pain and palpitations.   Gastrointestinal: Negative for abdominal distention, abdominal pain (LLQ pain), constipation, diarrhea and nausea.   Endocrine: Positive for heat intolerance.   Genitourinary:  "Positive for frequency and urgency. Negative for dyspareunia, dysuria, menstrual problem, pelvic pain, vaginal bleeding, vaginal discharge and vaginal pain.   Musculoskeletal: Positive for back pain.   Skin: Negative for color change and rash.   Neurological: Negative for headaches.   Psychiatric/Behavioral: Positive for sleep disturbance. Negative for agitation and dysphoric mood. The patient is not nervous/anxious.    All other systems reviewed and are negative.      /78   Ht 154.9 cm (61\")   Wt 59.9 kg (132 lb)   Breastfeeding No   BMI 24.94 kg/m²     Physical Exam   Constitutional: She is oriented to person, place, and time. She appears well-developed.   HENT:   Head: Normocephalic and atraumatic.   Eyes: Conjunctivae are normal. No scleral icterus.   Neck: No thyromegaly present.   Cardiovascular: Normal rate, regular rhythm and normal heart sounds.   Pulmonary/Chest: Effort normal and breath sounds normal. She has no wheezes. Right breast exhibits no inverted nipple, no mass, no nipple discharge, no skin change and no tenderness. Left breast exhibits no inverted nipple, no mass, no nipple discharge, no skin change and no tenderness.   B nipple bars   Abdominal: Soft. Normal appearance and bowel sounds are normal. She exhibits no distension and no mass. There is no abdominal tenderness (TTP LLQ). There is no rebound and no guarding. No hernia.   Genitourinary:    Rectum normal.      Pelvic exam was performed with patient supine.   There is no rash, tenderness, lesion or injury on the right labia. There is no rash, tenderness, lesion or injury on the left labia. Right adnexum displays no mass, no tenderness and no fullness. Left adnexum displays no mass, no tenderness and no fullness.    Vaginal discharge present.      No vaginal erythema, tenderness or bleeding.   No erythema, tenderness or bleeding in the vagina.    No foreign body in the vagina.      No signs of injury in the vagina.      " Genitourinary Comments: Uterus and cervix absent  Normal cuff     Neurological: She is alert and oriented to person, place, and time.   Skin: Skin is warm and dry.   Psychiatric: Her behavior is normal. Mood, judgment and thought content normal.   Nursing note and vitals reviewed.         Assessment/Plan    1) GYN HM: check  Pap/HPV/C/G/T  SBE demonstrated and encouraged.  2) STD screening: accepts.Condoms encouraged.  3) Contraception: s/p TLH with OC. Op note and path reviewed and no endometriosis or adenomyosis noted  4) Family Planning: no plans, encourage folic acid daily  5) Diet and Exercise discussed  6) Smoking Status: previous smoker- congratulations!  7) UTI- check UA and CS- ERX Macrobid 100 mg BID x 7 days  8) MMG- schedule now, [facility]  9)Hot flashes- check TSH, FSH, E2  10) Discussed with patient risks, benefits and alternatives to the Gardasil vaccination.  The vaccine is administered in the arm in a series of 3 shots at 02 in 6 months.  It provides a 70 to 90% reduction in HPV related diseases such as abnormal Pap smears, genital warts and cervical cancer.  The vaccine was originally approved for ages 9-26, but is recently been expanded to the age of 45.  The most common side effects are pain at the injection site and fainting.  Any and all adverse side effects are tracked by the FDA and are available on their website for review.  After consideration, the patient plans first dose today. RTO in 2 & 6 months for second and third gardasil shots ( nurse only)  11) HSV 2- call for valtrex 500 mg QD.   12)Parts of this document have been copied or forwarded from her previous visits and have been reviewed, updated and edited as indicated.   13) I saw the patient with a face mask, gloves and eye protection  The patient herself was masked.  Social distancing was observed as appropriate.  14) RTO 1 year annual and/or prn  15)  I spent >30 minutes on the separately reported service of UTI and hot flashes. This  time is not included in the time used to support the annual E/M service also reported today.         Diagnoses and all orders for this visit:    1. Need for HPV vaccination (Primary)  -     HPV 9-Valent Recomb Vaccine suspension 0.5 mL    2. Routine gynecological examination  -     POC Urinalysis Dipstick  -     Cancel: IGP, Apt HPV,rfx 16 / 18,45  -     IGP,CtNgTv,Apt HPV,rfx 16 / 18,45  -     Hepatitis B Surface Antigen  -     Hepatitis C Antibody  -     HIV-1 / O / 2 Ag / Antibody 4th Generation  -     HSV 1 & 2 - Specific Antibody, IgG  -     RPR, Rfx Qn RPR / Confirm TP  -     Estradiol  -     Follicle Stimulating Hormone  -     TSH Rfx On Abnormal To Free T4    3. Pap smear, low-risk  -     POC Urinalysis Dipstick  -     Cancel: IGP, Apt HPV,rfx 16 / 18,45  -     IGP,CtNgTv,Apt HPV,rfx 16 / 18,45  -     Hepatitis B Surface Antigen  -     Hepatitis C Antibody  -     HIV-1 / O / 2 Ag / Antibody 4th Generation  -     HSV 1 & 2 - Specific Antibody, IgG  -     RPR, Rfx Qn RPR / Confirm TP  -     Estradiol  -     Follicle Stimulating Hormone  -     TSH Rfx On Abnormal To Free T4    4. Special screening examination for human papillomavirus (HPV)  -     POC Urinalysis Dipstick  -     Cancel: IGP, Apt HPV,rfx 16 / 18,45  -     IGP,CtNgTv,Apt HPV,rfx 16 / 18,45  -     Hepatitis B Surface Antigen  -     Hepatitis C Antibody  -     HIV-1 / O / 2 Ag / Antibody 4th Generation  -     HSV 1 & 2 - Specific Antibody, IgG  -     RPR, Rfx Qn RPR / Confirm TP  -     Estradiol  -     Follicle Stimulating Hormone  -     TSH Rfx On Abnormal To Free T4    5. Urinary tract infection without hematuria, site unspecified  -     Urine Culture - Urine, Urine, Random Void  -     Urinalysis With Microscopic - Urine, Random Void    6. Hot flashes  -     Estradiol  -     Follicle Stimulating Hormone  -     TSH Rfx On Abnormal To Free T4    7. Encounter for screening mammogram for malignant neoplasm of breast  -     Mammo Screening Digital  Tomosynthesis Bilateral With CAD; Future    8. Screen for STD (sexually transmitted disease)    Other orders  -     nitrofurantoin, macrocrystal-monohydrate, (MACROBID) 100 MG capsule; Take 1 capsule by mouth 2 (Two) Times a Day for 7 days.  Dispense: 14 capsule; Refill: 0  -     Microscopic Examination -          Nadine Emanuel MD  1/9/2023  21:22 EST

## 2023-01-11 LAB
ESTRADIOL SERPL-MCNC: 220 PG/ML
FSH SERPL-ACNC: 6.1 MIU/ML
HBV SURFACE AG SERPL QL IA: NEGATIVE
HCV AB S/CO SERPL IA: <0.1 S/CO RATIO (ref 0–0.9)
HIV 1+2 AB+HIV1 P24 AG SERPL QL IA: NON REACTIVE
HSV1 IGG SER IA-ACNC: <0.91 INDEX (ref 0–0.9)
HSV2 IGG SER IA-ACNC: 6.58 INDEX (ref 0–0.9)
RPR SER QL: NON REACTIVE
TSH SERPL DL<=0.005 MIU/L-ACNC: 1.39 UIU/ML (ref 0.45–4.5)

## 2023-01-12 LAB
C TRACH RRNA CVX QL NAA+PROBE: NEGATIVE
CYTOLOGIST CVX/VAG CYTO: ABNORMAL
CYTOLOGY CVX/VAG DOC CYTO: ABNORMAL
CYTOLOGY CVX/VAG DOC THIN PREP: ABNORMAL
DX ICD CODE: ABNORMAL
HIV 1 & 2 AB SER-IMP: ABNORMAL
HPV GENOTYPE REFLEX: ABNORMAL
HPV I/H RISK 4 DNA CVX QL PROBE+SIG AMP: NEGATIVE
N GONORRHOEA RRNA CVX QL NAA+PROBE: NEGATIVE
OTHER STN SPEC: ABNORMAL
STAT OF ADQ CVX/VAG CYTO-IMP: ABNORMAL
T VAGINALIS RRNA SPEC QL NAA+PROBE: POSITIVE

## 2023-01-13 LAB
APPEARANCE UR: CLEAR
BACTERIA #/AREA URNS HPF: ABNORMAL /HPF
BACTERIA UR CULT: ABNORMAL
BACTERIA UR CULT: ABNORMAL
BILIRUB UR QL STRIP: NEGATIVE
CASTS URNS MICRO: ABNORMAL
COLOR UR: YELLOW
EPI CELLS #/AREA URNS HPF: ABNORMAL /HPF
GLUCOSE UR QL STRIP: NEGATIVE
HGB UR QL STRIP: NEGATIVE
KETONES UR QL STRIP: NEGATIVE
LEUKOCYTE ESTERASE UR QL STRIP: ABNORMAL
NITRITE UR QL STRIP: POSITIVE
OTHER ANTIBIOTIC SUSC ISLT: ABNORMAL
PH UR STRIP: 6.5 [PH] (ref 5–8)
PROT UR QL STRIP: NEGATIVE
RBC #/AREA URNS HPF: ABNORMAL /HPF
SP GR UR STRIP: 1.01 (ref 1–1.03)
UROBILINOGEN UR STRIP-MCNC: ABNORMAL MG/DL
WBC #/AREA URNS HPF: ABNORMAL /HPF

## 2023-01-13 RX ORDER — METRONIDAZOLE 500 MG/1
500 TABLET ORAL 2 TIMES DAILY
Qty: 14 TABLET | Refills: 1 | Status: SHIPPED | OUTPATIENT
Start: 2023-01-13 | End: 2023-01-20

## 2023-01-13 NOTE — PROGRESS NOTES
PIP= pap//HPV/C/G were normal, however, pt does have trichomonas, which is an STD . ERX for Flagyl called in. Her partners must be treated and rec they abstain from sex until both are treated with a negative MIRACLE. Rec condoms.  RTO in 1 month for a MIRACLE.

## 2023-02-14 ENCOUNTER — HOSPITAL ENCOUNTER (EMERGENCY)
Facility: HOSPITAL | Age: 43
Discharge: HOME | End: 2023-02-14
Attending: EMERGENCY MEDICINE | Admitting: EMERGENCY MEDICINE
Payer: COMMERCIAL

## 2023-02-14 ENCOUNTER — APPOINTMENT (OUTPATIENT)
Dept: GENERAL RADIOLOGY | Facility: HOSPITAL | Age: 43
End: 2023-02-14
Payer: COMMERCIAL

## 2023-02-14 VITALS
SYSTOLIC BLOOD PRESSURE: 96 MMHG | WEIGHT: 138.45 LBS | BODY MASS INDEX: 26.14 KG/M2 | HEART RATE: 69 BPM | DIASTOLIC BLOOD PRESSURE: 69 MMHG | RESPIRATION RATE: 18 BRPM | OXYGEN SATURATION: 98 % | TEMPERATURE: 98 F | HEIGHT: 61 IN

## 2023-02-14 DIAGNOSIS — J20.9 ACUTE BRONCHITIS, UNSPECIFIED ORGANISM: Primary | ICD-10-CM

## 2023-02-14 LAB
ALBUMIN SERPL-MCNC: 4.2 G/DL (ref 3.5–5.2)
ALBUMIN/GLOB SERPL: 1.9 G/DL
ALP SERPL-CCNC: 68 U/L (ref 39–117)
ALT SERPL W P-5'-P-CCNC: 23 U/L (ref 1–33)
ANION GAP SERPL CALCULATED.3IONS-SCNC: 8.7 MMOL/L (ref 5–15)
AST SERPL-CCNC: 16 U/L (ref 1–32)
BASOPHILS # BLD AUTO: 0.06 10*3/MM3 (ref 0–0.2)
BASOPHILS NFR BLD AUTO: 0.6 % (ref 0–1.5)
BILIRUB SERPL-MCNC: 0.2 MG/DL (ref 0–1.2)
BUN SERPL-MCNC: 7 MG/DL (ref 6–20)
BUN/CREAT SERPL: 7.6 (ref 7–25)
CALCIUM SPEC-SCNC: 8.8 MG/DL (ref 8.6–10.5)
CHLORIDE SERPL-SCNC: 106 MMOL/L (ref 98–107)
CO2 SERPL-SCNC: 27.3 MMOL/L (ref 22–29)
CREAT SERPL-MCNC: 0.92 MG/DL (ref 0.57–1)
DEPRECATED RDW RBC AUTO: 44.9 FL (ref 37–54)
EGFRCR SERPLBLD CKD-EPI 2021: 80.9 ML/MIN/1.73
EOSINOPHIL # BLD AUTO: 0.21 10*3/MM3 (ref 0–0.4)
EOSINOPHIL NFR BLD AUTO: 2.2 % (ref 0.3–6.2)
ERYTHROCYTE [DISTWIDTH] IN BLOOD BY AUTOMATED COUNT: 13.2 % (ref 12.3–15.4)
GLOBULIN UR ELPH-MCNC: 2.2 GM/DL
GLUCOSE SERPL-MCNC: 86 MG/DL (ref 65–99)
HCT VFR BLD AUTO: 38 % (ref 34–46.6)
HGB BLD-MCNC: 12.6 G/DL (ref 12–15.9)
HOLD SPECIMEN: NORMAL
HOLD SPECIMEN: NORMAL
IMM GRANULOCYTES # BLD AUTO: 0.01 10*3/MM3 (ref 0–0.05)
IMM GRANULOCYTES NFR BLD AUTO: 0.1 % (ref 0–0.5)
LYMPHOCYTES # BLD AUTO: 2.79 10*3/MM3 (ref 0.7–3.1)
LYMPHOCYTES NFR BLD AUTO: 29.1 % (ref 19.6–45.3)
MCH RBC QN AUTO: 30.3 PG (ref 26.6–33)
MCHC RBC AUTO-ENTMCNC: 33.2 G/DL (ref 31.5–35.7)
MCV RBC AUTO: 91.3 FL (ref 79–97)
MONOCYTES # BLD AUTO: 0.82 10*3/MM3 (ref 0.1–0.9)
MONOCYTES NFR BLD AUTO: 8.6 % (ref 5–12)
NEUTROPHILS NFR BLD AUTO: 5.7 10*3/MM3 (ref 1.7–7)
NEUTROPHILS NFR BLD AUTO: 59.4 % (ref 42.7–76)
NRBC BLD AUTO-RTO: 0 /100 WBC (ref 0–0.2)
NT-PROBNP SERPL-MCNC: 45.6 PG/ML (ref 0–450)
PLATELET # BLD AUTO: 318 10*3/MM3 (ref 140–450)
PMV BLD AUTO: 10.9 FL (ref 6–12)
POTASSIUM SERPL-SCNC: 3.9 MMOL/L (ref 3.5–5.2)
PROT SERPL-MCNC: 6.4 G/DL (ref 6–8.5)
QT INTERVAL: 405 MS
RBC # BLD AUTO: 4.16 10*6/MM3 (ref 3.77–5.28)
SODIUM SERPL-SCNC: 142 MMOL/L (ref 136–145)
TROPONIN T SERPL HS-MCNC: <6 NG/L
WBC NRBC COR # BLD: 9.59 10*3/MM3 (ref 3.4–10.8)
WHOLE BLOOD HOLD COAG: NORMAL
WHOLE BLOOD HOLD SPECIMEN: NORMAL

## 2023-02-14 PROCEDURE — 84484 ASSAY OF TROPONIN QUANT: CPT

## 2023-02-14 PROCEDURE — 99283 EMERGENCY DEPT VISIT LOW MDM: CPT

## 2023-02-14 PROCEDURE — 93005 ELECTROCARDIOGRAM TRACING: CPT

## 2023-02-14 PROCEDURE — 83880 ASSAY OF NATRIURETIC PEPTIDE: CPT

## 2023-02-14 PROCEDURE — 71045 X-RAY EXAM CHEST 1 VIEW: CPT

## 2023-02-14 PROCEDURE — 93005 ELECTROCARDIOGRAM TRACING: CPT | Performed by: EMERGENCY MEDICINE

## 2023-02-14 PROCEDURE — 93010 ELECTROCARDIOGRAM REPORT: CPT | Performed by: INTERNAL MEDICINE

## 2023-02-14 PROCEDURE — 36415 COLL VENOUS BLD VENIPUNCTURE: CPT

## 2023-02-14 PROCEDURE — 80053 COMPREHEN METABOLIC PANEL: CPT

## 2023-02-14 PROCEDURE — 85025 COMPLETE CBC W/AUTO DIFF WBC: CPT

## 2023-02-14 RX ORDER — BROMPHENIRAMINE MALEATE, PSEUDOEPHEDRINE HYDROCHLORIDE, AND DEXTROMETHORPHAN HYDROBROMIDE 2; 30; 10 MG/5ML; MG/5ML; MG/5ML
10 SYRUP ORAL 4 TIMES DAILY PRN
Qty: 473 ML | Refills: 0 | Status: SHIPPED | OUTPATIENT
Start: 2023-02-14

## 2023-02-14 RX ORDER — SODIUM CHLORIDE 0.9 % (FLUSH) 0.9 %
10 SYRINGE (ML) INJECTION AS NEEDED
Status: DISCONTINUED | OUTPATIENT
Start: 2023-02-14 | End: 2023-02-14 | Stop reason: HOSPADM

## 2023-02-14 RX ORDER — AZITHROMYCIN 250 MG/1
TABLET, FILM COATED ORAL
Qty: 6 TABLET | Refills: 0 | Status: SHIPPED | OUTPATIENT
Start: 2023-02-14

## 2023-02-14 RX ORDER — PREDNISONE 20 MG/1
60 TABLET ORAL DAILY
Qty: 15 TABLET | Refills: 0 | Status: SHIPPED | OUTPATIENT
Start: 2023-02-14 | End: 2023-02-19

## 2023-02-14 NOTE — ED PROVIDER NOTES
Time: 3:22 AM EST  Date of encounter:  2/14/2023  Independent Historian/Clinical History and Information was obtained by:   Patient  Chief Complaint: Cough and congestion    History is limited by: N/A    History of Present Illness:  Patient is a 42 y.o. year old female who presents to the emergency department for evaluation of worsening cough and congestion      History provided by:  Patient  Cough  Cough characteristics:  Productive  Sputum characteristics:  Nondescript  Severity:  Moderate  Onset quality:  Gradual  Duration:  3 days  Timing:  Constant  Progression:  Worsening  Chronicity:  New  Smoker: yes    Context: upper respiratory infection    Relieved by:  Nothing  Worsened by:  Activity and deep breathing (Coughing)  Ineffective treatments:  Steroid inhaler (Patient has as needed albuterol inhaler from last year)  Associated symptoms: shortness of breath ( Gets to coughing so much and cannot stop and feels short of breath), sinus congestion and wheezing ( At times)    Associated symptoms: no chest pain, no chills, no diaphoresis, no ear fullness, no ear pain, no eye discharge, no fever, no headaches, no myalgias, no rash, no rhinorrhea and no sore throat        Patient Care Team  Primary Care Provider: Wilton Brice MD    Past Medical History:     Allergies   Allergen Reactions   • Hydrocodone-Acetaminophen Nausea And Vomiting     PT ON NORCO 5 AND TOLERATES FINE, BUT REPORT VICODIN CAUSED N/V AFTER DENTAL EXTRACTION   • Tramadol Nausea And Vomiting     Past Medical History:   Diagnosis Date   • Abnormal Pap smear of cervix     1 abnl wiht nl f/u   • Anxiety    • Bacterial vaginosis    • Depression    • Herpes     + HSV serology, no outbreaks   • Urinary tract infection      Past Surgical History:   Procedure Laterality Date   • BACK SURGERY     • D & C WITH SUCTION      SAB x1   • HYSTERECTOMY  08/2015    TLH/BS with OC   • KNEE ARTHROSCOPY     • SHOULDER LIGAMENT REPAIR     • TUBAL ABDOMINAL LIGATION   2012     Family History   Problem Relation Age of Onset   • Breast cancer Neg Hx    • Ovarian cancer Neg Hx    • Colon cancer Neg Hx    • Prostate cancer Neg Hx    • Deep vein thrombosis Neg Hx        Home Medications:  Prior to Admission medications    Medication Sig Start Date End Date Taking? Authorizing Provider   acetaminophen (TYLENOL) 325 MG tablet Take 2 tablets by mouth Every 4 (Four) Hours As Needed for Mild Pain . 4/19/22   Shelley Nelson APRN   buPROPion XL (WELLBUTRIN XL) 150 MG 24 hr tablet Take 150 mg by mouth Daily.    Provider, MD Zenaida   DULoxetine (CYMBALTA) 60 MG capsule Take 60 mg by mouth Daily.    Provider, MD Zenaida   hydrOXYzine (ATARAX) 25 MG tablet Take 1 tablet by mouth Every 8 (Eight) Hours As Needed for Anxiety. 6/11/21   Jenelle Pulido PA-C   omeprazole (PriLOSEC) 40 MG capsule Take 1 capsule by mouth daily. 8/5/16   Valarie Ramirez APRN        Social History:   Social History     Tobacco Use   • Smoking status: Smoker, Current Status Unknown     Packs/day: 0.50     Years: 6.00     Pack years: 3.00     Types: Cigarettes   • Smokeless tobacco: Never   • Tobacco comments:     quit 2 years, but started back recently   Vaping Use   • Vaping Use: Former   Substance Use Topics   • Alcohol use: No   • Drug use: Yes     Types: Methamphetamines, Cocaine(coke)     Comment: used 2 years ago         Review of Systems:  Review of Systems   Constitutional: Negative for chills, diaphoresis, fatigue and fever.   HENT: Positive for congestion. Negative for ear pain, rhinorrhea and sore throat.    Eyes: Negative for discharge and visual disturbance.   Respiratory: Positive for cough, shortness of breath ( Gets to coughing so much and cannot stop and feels short of breath) and wheezing ( At times).    Cardiovascular: Negative for chest pain, palpitations and leg swelling.   Gastrointestinal: Negative for abdominal pain, diarrhea and vomiting.   Genitourinary: Negative for difficulty  "urinating and flank pain.   Musculoskeletal: Negative for arthralgias, back pain and myalgias.   Skin: Negative for rash.   Neurological: Negative for light-headedness and headaches.   Hematological: Negative for adenopathy.   Psychiatric/Behavioral: Negative.         Physical Exam:  /67   Pulse 70   Temp 98 °F (36.7 °C) (Oral)   Resp 18   Ht 154.9 cm (61\")   Wt 62.8 kg (138 lb 7.2 oz)   SpO2 99%   BMI 26.16 kg/m²     Physical Exam  Vitals and nursing note reviewed.   Constitutional:       General: She is not in acute distress.     Appearance: Normal appearance. She is not toxic-appearing.   HENT:      Head: Normocephalic and atraumatic.      Right Ear: Tympanic membrane, ear canal and external ear normal.      Left Ear: Tympanic membrane, ear canal and external ear normal.      Nose: Congestion present.      Mouth/Throat:      Mouth: Mucous membranes are moist.      Pharynx: No posterior oropharyngeal erythema.   Eyes:      General: No scleral icterus.     Conjunctiva/sclera: Conjunctivae normal.   Cardiovascular:      Rate and Rhythm: Normal rate and regular rhythm.      Pulses: Normal pulses.      Heart sounds: Normal heart sounds.   Pulmonary:      Effort: Pulmonary effort is normal. No respiratory distress.      Breath sounds: Normal breath sounds.   Abdominal:      Tenderness: There is no abdominal tenderness.   Musculoskeletal:         General: Normal range of motion.      Cervical back: Normal range of motion and neck supple.      Right lower leg: No edema.      Left lower leg: No edema.   Lymphadenopathy:      Cervical: No cervical adenopathy.   Skin:     General: Skin is warm and dry.   Neurological:      Mental Status: She is alert and oriented to person, place, and time.   Psychiatric:         Mood and Affect: Mood normal.         Behavior: Behavior normal.          Procedures:  Procedures      Medical Decision Making:      Comorbidities that affect care:    Smoking    External Notes " reviewed:    None      The following orders were placed and all results were independently analyzed by me:  Orders Placed This Encounter   Procedures   • XR Chest 1 View   • Hope Draw   • Comprehensive Metabolic Panel   • BNP   • High Sensitivity Troponin T   • CBC Auto Differential   • NPO Diet NPO Type: Strict NPO   • Undress & Gown   • Cardiac Monitoring   • Continuous Pulse Oximetry   • Vital Signs   • Oxygen Therapy- Nasal Cannula; 2 LPM; Titrate for SPO2: equal to or greater than, 92%   • ECG 12 Lead ED Triage Standing Order; SOA   • Insert Peripheral IV   • CBC & Differential   • Green Top (Gel)   • Lavender Top   • Gold Top - SST   • Light Blue Top       Medications Given in the Emergency Department:  Medications   sodium chloride 0.9 % flush 10 mL (has no administration in time range)        ED Course:    ED Course as of 02/14/23 0341 Tue Feb 14, 2023   0322 XR Chest 1 View  No acute findings [DS]   0322 ECG 12 Lead ED Triage Standing Order; SOA  Normal sinus rhythm heart rate 67 [DS]      ED Course User Index  [DS] Janel Heard APRN       Labs:    Lab Results (last 24 hours)     Procedure Component Value Units Date/Time    CBC & Differential [[id number]]  (Normal) Collected: 02/14/23 0040    Specimen: Blood Updated: 02/14/23 0052    Narrative:      The following orders were created for panel order CBC & Differential.  Procedure                               Abnormality         Status                     ---------                               -----------         ------                     CBC Auto Differential[[id number]]        Normal              Final result                 Please view results for these tests on the individual orders.    Comprehensive Metabolic Panel [[id number]] Collected: 02/14/23 0040    Specimen: Blood Updated: 02/14/23 0113     Glucose 86 mg/dL      BUN 7 mg/dL      Creatinine 0.92 mg/dL      Sodium 142 mmol/L      Potassium 3.9 mmol/L      Chloride 106 mmol/L      CO2 27.3  mmol/L      Calcium 8.8 mg/dL      Total Protein 6.4 g/dL      Albumin 4.2 g/dL      ALT (SGPT) 23 U/L      AST (SGOT) 16 U/L      Alkaline Phosphatase 68 U/L      Total Bilirubin 0.2 mg/dL      Globulin 2.2 gm/dL      A/G Ratio 1.9 g/dL      BUN/Creatinine Ratio 7.6     Anion Gap 8.7 mmol/L      eGFR 80.9 mL/min/1.73     Narrative:      GFR Normal >60  Chronic Kidney Disease <60  Kidney Failure <15      BNP [[id number]]  (Normal) Collected: 02/14/23 0040    Specimen: Blood Updated: 02/14/23 0110     proBNP 45.6 pg/mL     Narrative:      Among patients with dyspnea, NT-proBNP is highly sensitive for the detection of acute congestive heart failure. In addition NT-proBNP of <300 pg/ml effectively rules out acute congestive heart failure with 99% negative predictive value.      High Sensitivity Troponin T [[id number]]  (Normal) Collected: 02/14/23 0040    Specimen: Blood Updated: 02/14/23 0113     HS Troponin T <6 ng/L     Narrative:      High Sensitive Troponin T Reference Range:  <10.0 ng/L- Negative Female for AMI  <15.0 ng/L- Negative Male for AMI  >=10 - Abnormal Female indicating possible myocardial injury.  >=15 - Abnormal Male indicating possible myocardial injury.   Clinicians would have to utilize clinical acumen, EKG, Troponin, and serial changes to determine if it is an Acute Myocardial Infarction or myocardial injury due to an underlying chronic condition.         CBC Auto Differential [[id number]]  (Normal) Collected: 02/14/23 0040    Specimen: Blood Updated: 02/14/23 0052     WBC 9.59 10*3/mm3      RBC 4.16 10*6/mm3      Hemoglobin 12.6 g/dL      Hematocrit 38.0 %      MCV 91.3 fL      MCH 30.3 pg      MCHC 33.2 g/dL      RDW 13.2 %      RDW-SD 44.9 fl      MPV 10.9 fL      Platelets 318 10*3/mm3      Neutrophil % 59.4 %      Lymphocyte % 29.1 %      Monocyte % 8.6 %      Eosinophil % 2.2 %      Basophil % 0.6 %      Immature Grans % 0.1 %      Neutrophils, Absolute 5.70 10*3/mm3      Lymphocytes, Absolute  2.79 10*3/mm3      Monocytes, Absolute 0.82 10*3/mm3      Eosinophils, Absolute 0.21 10*3/mm3      Basophils, Absolute 0.06 10*3/mm3      Immature Grans, Absolute 0.01 10*3/mm3      nRBC 0.0 /100 WBC            Imaging:    XR Chest 1 View    Result Date: 2/14/2023  PROCEDURE: XR CHEST 1 VW  COMPARISON: None.  INDICATIONS: COUGH; SHORTNESS OF BREATH.  FINDINGS: A single frontal (AP or PA upright portable) chest radiograph reveals no cardiac enlargement and no acute infiltrate. No pneumothorax is seen.  There are postoperative changes of the cervical spine.  Minimal dextroscoliosis of the mid-to-lower thoracic spine and minimal levoscoliosis of the upper thoracic spine are possible.       No acute cardiopulmonary disease is seen radiographically.     Please note that portions of this note were completed with a voice recognition program.  CAMELIA GREENBERG JR, MD       Electronically Signed and Approved By: CAMELIA GREENBERG JR, MD on 2/14/2023 at 1:30                  Differential Diagnosis and Discussion:    Cough: Differential diagnosis includes but is not limited to pneumonia, acute bronchitis, upper respiratory infection, ACE inhibitor use, allergic reaction, epiglottitis, seasonal allergies, chemical irritants, exercise-induced asthma, viral syndrome.    All labs were reviewed and interpreted by me.  All X-rays were independently reviewed by me.  EKG was interpreted by supervising attending.    MDM  Number of Diagnoses or Management Options  Acute bronchitis, unspecified organism  Diagnosis management comments: I have explained the patient´s condition, diagnoses and treatment plan based on the information available to me at this time. I have answered questions and addressed any concerns. The patient has a good  understanding of the patient´s diagnosis, condition, and treatment plan as can be expected at this point. The vital signs have been stable. The patient´s condition is stable and appropriate for discharge from  the emergency department.      The patient will pursue further outpatient evaluation with the primary care physician or other designated or consulting physician as outlined in the discharge instructions. They are agreeable to this plan of care and follow-up instructions have been explained in detail. The patient has received these instructions in written format and have expressed an understanding of the discharge instructions. The patient is aware that any significant change in condition or worsening of symptoms should prompt an immediate return to this or the closest emergency department or call to 911.         Amount and/or Complexity of Data Reviewed  Clinical lab tests: reviewed and ordered  Tests in the radiology section of CPT®: reviewed and ordered  Tests in the medicine section of CPT®: reviewed and ordered    Risk of Complications, Morbidity, and/or Mortality  Presenting problems: low  Diagnostic procedures: low  Management options: low    Patient Progress  Patient progress: stable       Patient Care Considerations:    CT CHEST: I considered ordering a CT scan of the chest, however Patient PERC negative PERC: I used the PERC score to risk stratify the patient for PE and a CT of the chest was considered but ultimately not indicated in today's visit.      Consultants/Shared Management Plan:    None    Social Determinants of Health:    Patient is independent, reliable, and has access to care.       Disposition and Care Coordination:    Discharged: The patient is suitable and stable for discharge with no need for consideration of observation or admission.    I have explained the patient´s condition, diagnoses and treatment plan based on the information available to me at this time. I have answered questions and addressed any concerns. The patient has a good  understanding of the patient´s diagnosis, condition, and treatment plan as can be expected at this point. The vital signs have been stable. The patient´s  condition is stable and appropriate for discharge from the emergency department.      The patient will pursue further outpatient evaluation with the primary care physician or other designated or consulting physician as outlined in the discharge instructions. They are agreeable to this plan of care and follow-up instructions have been explained in detail. The patient has received these instructions in written format and have expressed an understanding of the discharge instructions. The patient is aware that any significant change in condition or worsening of symptoms should prompt an immediate return to this or the closest emergency department or call to 911.    Final diagnoses:   Acute bronchitis, unspecified organism        ED Disposition     ED Disposition   Discharge    Condition   Stable    Comment   --             This medical record created using voice recognition software.           Janel Heard, APRN  02/14/23 [id number]

## 2023-02-14 NOTE — DISCHARGE INSTRUCTIONS
Take medications as prescribed.    Follow-up with your PCP if no better.    No smoking.    Return for new or worsening symptoms

## 2023-03-28 ENCOUNTER — HOSPITAL ENCOUNTER (OUTPATIENT)
Dept: MAMMOGRAPHY | Facility: HOSPITAL | Age: 43
Discharge: HOME | End: 2023-03-28
Admitting: OBSTETRICS & GYNECOLOGY
Payer: COMMERCIAL

## 2023-03-28 DIAGNOSIS — Z12.31 ENCOUNTER FOR SCREENING MAMMOGRAM FOR MALIGNANT NEOPLASM OF BREAST: ICD-10-CM

## 2023-03-28 PROCEDURE — 77063 BREAST TOMOSYNTHESIS BI: CPT

## 2023-03-28 PROCEDURE — 77067 SCR MAMMO BI INCL CAD: CPT

## 2023-03-31 DIAGNOSIS — R92.8 ABNORMAL MAMMOGRAM: Primary | ICD-10-CM

## 2023-04-06 ENCOUNTER — TRANSCRIBE ORDERS (OUTPATIENT)
Dept: ADMINISTRATIVE | Facility: HOSPITAL | Age: 43
End: 2023-04-06
Payer: COMMERCIAL

## 2023-04-06 DIAGNOSIS — R92.8 ABNORMAL MAMMOGRAM: Primary | ICD-10-CM

## 2023-04-21 DIAGNOSIS — R92.8 ABNORMAL MAMMOGRAM: Primary | ICD-10-CM

## 2023-04-28 ENCOUNTER — HOSPITAL ENCOUNTER (OUTPATIENT)
Dept: ULTRASOUND IMAGING | Facility: HOSPITAL | Age: 43
Discharge: HOME | End: 2023-04-28
Payer: COMMERCIAL

## 2023-04-28 ENCOUNTER — HOSPITAL ENCOUNTER (OUTPATIENT)
Dept: MAMMOGRAPHY | Facility: HOSPITAL | Age: 43
Discharge: HOME | End: 2023-04-28
Payer: COMMERCIAL

## 2023-04-28 DIAGNOSIS — R92.8 ABNORMAL MAMMOGRAM: ICD-10-CM

## 2023-04-28 PROCEDURE — 76642 ULTRASOUND BREAST LIMITED: CPT

## 2023-04-28 PROCEDURE — 77065 DX MAMMO INCL CAD UNI: CPT

## 2023-04-28 PROCEDURE — G0279 TOMOSYNTHESIS, MAMMO: HCPCS

## 2023-05-08 DIAGNOSIS — R92.8 ABNORMAL MAMMOGRAM: Primary | ICD-10-CM

## 2023-11-15 ENCOUNTER — HOSPITAL ENCOUNTER (OUTPATIENT)
Dept: ULTRASOUND IMAGING | Facility: HOSPITAL | Age: 43
Discharge: HOME | End: 2023-11-15
Payer: COMMERCIAL

## 2023-11-15 ENCOUNTER — HOSPITAL ENCOUNTER (OUTPATIENT)
Dept: MAMMOGRAPHY | Facility: HOSPITAL | Age: 43
Discharge: HOME | End: 2023-11-15
Payer: COMMERCIAL

## 2023-11-15 DIAGNOSIS — R92.8 ABNORMAL MAMMOGRAM: ICD-10-CM

## 2023-11-15 PROCEDURE — G0279 TOMOSYNTHESIS, MAMMO: HCPCS

## 2023-11-15 PROCEDURE — 77065 DX MAMMO INCL CAD UNI: CPT

## 2023-11-16 DIAGNOSIS — R92.8 ABNORMAL MAMMOGRAM: Primary | ICD-10-CM

## 2023-11-16 NOTE — PROGRESS NOTES
PIP= pt has stable MMG findings. Rec repeat B dx MMG with MARYSOL and L breast US in 4/2024 to document stability.